# Patient Record
Sex: MALE | Race: WHITE | NOT HISPANIC OR LATINO | Employment: UNEMPLOYED | URBAN - METROPOLITAN AREA
[De-identification: names, ages, dates, MRNs, and addresses within clinical notes are randomized per-mention and may not be internally consistent; named-entity substitution may affect disease eponyms.]

---

## 2020-07-21 ENCOUNTER — TRANSFERRED RECORDS (OUTPATIENT)
Dept: HEALTH INFORMATION MANAGEMENT | Facility: CLINIC | Age: 55
End: 2020-07-21

## 2023-03-13 ENCOUNTER — TRANSFERRED RECORDS (OUTPATIENT)
Dept: HEALTH INFORMATION MANAGEMENT | Facility: CLINIC | Age: 58
End: 2023-03-13

## 2023-04-12 ENCOUNTER — TRANSFERRED RECORDS (OUTPATIENT)
Dept: HEALTH INFORMATION MANAGEMENT | Facility: CLINIC | Age: 58
End: 2023-04-12

## 2023-06-12 ENCOUNTER — TRANSFERRED RECORDS (OUTPATIENT)
Dept: HEALTH INFORMATION MANAGEMENT | Facility: CLINIC | Age: 58
End: 2023-06-12

## 2023-11-10 NOTE — H&P (VIEW-ONLY)
[facility] [place]      Preoperative Consultation   Rolo Arevalo   : 1965   Gender: male    Date of Encounter: 2023    Nursing Notes:   Bisi Irby LPN  2023  9:33 AM  Addendum  Chief Complaint   Patient presents with     Pre-Op Exam     23, back fusion, Dr. Ronnie Rico     Patient Name:  Rolo Arevalo  Date of Exam:  2023  Date of Surgery:  2023    Primary Provider:  Nevin Valladares NP  Provider performing PreOp: Nevin Valladares CNP  Planned Surgery: fusion, L5-S1  Surgeon:  Dr. Trujillo  Proposed anesthesia: General  Anesthesia Complications: None  History of abnormal bleeding : None  History of Blood Transfusions: No    Surgery Location:  [facility]  Surgery Location Phone:   Surgery Location Fax: [phone]    Last Tetanus: last tetanus booster within 10 years    Does the patient current use CPAP? No  Do you snore loudly (louder than talking or enough to be heard through closed doors? No  Do you often feel fatigued, tired, or sleepy during the daytime? No  Has anyone observed you stop breathing during your sleep? No  Do you have or are you being treated for high blood pressure? Yes  Is your BMI greater than 35? No  Are you older than 50? Yes  Is your neck circumference greater than 40cm? No  Are you a male? Yes      N/A   High Risk (yes to 5 or more)      X    Intermediate risk (Yes to 3-4)    N/A   Low Risk (yes to 2 or less)       Cydney LEGER LPN......9:00 AM 2023        History of Present Illness   Rolo is a 58 yr male who presents to clinic today for preoperative examination. He will be having L5-S1 with Dr. Trujillo on 23.   PMH hypertension, on lisinopril/HCTZ. Denies chest pain, dyspnea, leg edema. Normal stress test 2023. Holter monitor results normal.  Recently started on lexapro and hydroxyzine for anxiety and depression, feels his symptoms are improving.   Denies any recent cough, cold, congestion or other URI symptoms.    Able to tolerate 4 METS activity without cardiopulmonary symptoms, but is limited by mobility, low back pain.    Review of Systems   Review of Systems   Constitutional:  Negative for chills, diaphoresis, fever and malaise/fatigue.   HENT: Negative.     Respiratory:  Negative for cough, shortness of breath and wheezing.    Cardiovascular:  Negative for chest pain, palpitations, orthopnea and leg swelling.   Genitourinary: Negative.    Musculoskeletal:  Positive for back pain.   Skin: Negative.    Neurological: Negative.       Patient Active Problem List   Diagnosis Code     Alcohol abuse F10.10     Cervical radiculopathy M54.12     Lumbar radiculopathy M54.16     Lumbar spinal stenosis M48.061     DDD (degenerative disc disease), cervical M50.30     Essential hypertension I10     Right inguinal hernia K40.90     Chronic bilateral low back pain with left-sided sciatica M54.42, G89.29     Left sided sciatica M54.32     Spondylosis without myelopathy or radiculopathy, lumbar region M47.816     Lumbar back pain M54.50     Myofascial pain syndrome of lumbar spine M79.18     Acute lumbar myofascial strain S39.012A     Spondylosis of lumbar region without myelopathy or radiculopathy M47.816     Lumbar pain M54.50     Back pain M54.9     Lumbar stenosis with neurogenic claudication M48.062     Current Outpatient Medications   Medication Sig     escitalopram oxalate (LEXAPRO) 10 mg tablet Take 1 Tablet (10 mg) by mouth once daily.     hydroCHLOROthiazide (HCTZ) 25 mg tablet Take 0.5 Tablets (12.5 mg) by mouth once daily.     hydrOXYzine HCL (ATARAX) 25 mg tablet Take 1 Tablet (25 mg) by mouth every 8 hours if needed for Anxiety.     lisinopriL (PRINIVIL; ZESTRIL) 40 mg tablet Take 1 Tablet (40 mg) by mouth once daily.     No current facility-administered medications for this visit.     Medications have been reviewed by me and are current to the best of my knowledge and ability.     No Known Allergies  Past Surgical History:  "  . Laterality Date     APPENDECTOMY       COLONOSCOPY  10/20/2023    Adenomatous polyp, hemorrhoids     EPIDURAL STEROID INJECTION Left 03/29/2023    Transforaminal L4, L5     EPIDURAL STEROID INJECTION Left 09/06/2023    Transforaminal L5, S1 (x2) 09/06/2023, 05/03/2023     Social History     Tobacco Use     Smoking status: Never     Smokeless tobacco: Never   Vaping Use     Vaping Use: Never used   Substance Use Topics     Alcohol use: Yes     Alcohol/week: 48.0 standard drinks of alcohol     Types: 48 Cans of beer per week     Comment: at least 2 cases per week     Drug use: Never     Family History   Problem Relation Age of Onset     Cancer Mother      Bilateral breast cancer Mother      Heart Disease Father      No Known Problems Sister      No Known Problems Daughter      No Known Problems Son        PAST DIFFICULTY WITH ANESTHESIA: None     Physical Exam   /90 (Cuff Site: Right Arm, Position: Sitting, Cuff Size: Adult Regular)   Pulse 71   Temp 97.4  F (36.3  C) (Oral)   Resp 16   Ht 1.708 m (5' 7.24\")   Wt 75.3 kg (166 lb)   SpO2 100%   BMI 25.81 kg/m   Body mass index is 25.81 kg/m .  Physical Exam  Vitals and nursing note reviewed.   Constitutional:       Appearance: Normal appearance.   HENT:      Nose: Nose normal.      Mouth/Throat:      Pharynx: Oropharynx is clear.   Cardiovascular:      Rate and Rhythm: Normal rate and regular rhythm.      Heart sounds: Normal heart sounds.   Pulmonary:      Effort: Pulmonary effort is normal. No respiratory distress.      Breath sounds: Normal breath sounds.   Skin:     General: Skin is warm and dry.      Capillary Refill: Capillary refill takes less than 2 seconds.   Neurological:      General: No focal deficit present.      Mental Status: He is alert.   Psychiatric:         Mood and Affect: Mood normal.        Assessment / Plan       The Pre-Op Tool    Recommendations      Intermediate Risk Procedure    Risk of CV Complication (RCRI)  0.5%    Current " Cardiac Status  Poor exertional capacity ( < 4 mets )    Cardiac History  No history of coronary artery disease           Labs  HGB within last 30 days  Potassium within last 30 days  EKG  Not indicated  CXR  Not indicated    Stress Testing  Not indicated    * Testing recommendations are intended to assist, but not direct, clinical decisions.           Type & Screen should be obtained by Anesthesia only if the risk of transfusion is > 5% for the procedure         Hold Lisinopril / HCTZ the evening before and/or morning of the procedure.  Hold Ibuprofen (Advil) for 2 days prior to the procedure.  Take your other medications as usual prior to the procedure  Okay to take Acetaminophen (Tylenol) up until the procedure  Hold / avoid NSAIDs (e.g. ibuprofen, naproxen) prior to procedure: 2 days for ibuprofen (Advil) and 4 days for naproxen (Aleve).  Follow instructions given by your surgical team    * Medication recommendations are not intended to be exhaustive; they are limited to common medications that are potentially dangerous if incorrectly managed          Labs  * Data supports elimination of  routine  laboratory testing in favor of focused,  indicated  testing based on medical co-morbidities. A 2009 study randomized 1061 patients undergoing ambulatory, non-cataract surgery to routine or to indicated testing. Perioperative adverse events were similar (Anesthesia & Analgesia 2009;108:467-75; Anesthesiol. Clin. 2016 Mar;34(1):43-58).  EKG  * Age alone is not an absolute indication for a preoperative EKG, and in the absence of clinical risk factors, there is no consensus on the utility of routine preoperative EKG. Our experts recommend against obtaining an EKG if age < 65 for intermediate risk procedures (Anesthesology. 2012;116(3) 1-17; JACC. 2014;64(21);e1-76).  Stress Testing  * The current ACC/AHA guideline states that 'non-invasive stress testing is not useful for patients [with no clinical risk factors] undergoing  noncardiac surgery' ([facility]. 2014;64(21);e1-76.).     Session ID: [id number]  Endnotes and bibliography available upon request: [email or web address]  Labs:   11/9/2023  WBC: 4.5     11/9/2023  RBC: 4.71  11/9/2023  HGB: 14.5  11/9/2023  HCT: 44.2   11/9/2023  MCV: 94    11/9/2023  MCH: 30.8   11/9/2023 MCHC: 32.8   11/9/2023  RDW: 13.2   11/9/2023  PLT: 399  SODIUM   Date Value Ref Range Status   11/09/2023 135 (L) 137 - 145 mmol/L Final     POTASSIUM   Date Value Ref Range Status   11/09/2023 5.0 3.5 - 5.1 mmol/L Final     CHLORIDE   Date Value Ref Range Status   11/09/2023 97 (L) 98 - 107 mmol/L Final     CO2,TOTAL   Date Value Ref Range Status   11/09/2023 31 (H) 22 - 30 mmol/L Final     ANION GAP WWH   Date Value Ref Range Status   11/09/2023 7 5 - 18 Final     GLUCOSE   Date Value Ref Range Status   11/09/2023 109 (H) 65 - 100 mg/dL Final     BUN   Date Value Ref Range Status   11/09/2023 18.0 9.0 - 20.0 mg/dL Final     CREATININE   Date Value Ref Range Status   11/09/2023 0.79 0.66 - 1.25 mg/dL Final     CALCIUM   Date Value Ref Range Status   11/09/2023 9.6 8.4 - 10.2 mg/dL Final     ECG: 3/13/23: sinus rhythm, occasional PVC    ICD-10-CM    1. Preop examination  Z01.818 BASIC METABOLIC PANEL     CBC AND DIFFERENTIAL      2. Chronic bilateral low back pain with left-sided sciatica  M54.42     G89.29         Patient is scheduled L5-S1 fusion, for this procedure pt is intermediate risk for anesthesia complications with a stop bang score of 3.   Medical conditions are diagnostically and therapeutically optimized for planned procedure.      This document was partially prepared using Dragon software.     Total time preparing to see this patient, face-to-face time, and coordinating care time on the same calendar date: 35 minutes.    Nevin Valladares CNP . . . 12:08 PM . . . 11/10/2023

## 2023-12-04 RX ORDER — HYDROCHLOROTHIAZIDE 25 MG/1
0.5 TABLET ORAL DAILY
COMMUNITY
Start: 2023-02-15

## 2023-12-04 RX ORDER — LISINOPRIL 40 MG/1
1 TABLET ORAL DAILY
COMMUNITY
Start: 2023-02-15

## 2023-12-04 RX ORDER — ESCITALOPRAM OXALATE 10 MG/1
1 TABLET ORAL DAILY
COMMUNITY
Start: 2023-09-18

## 2023-12-04 RX ORDER — HYDROXYZINE HYDROCHLORIDE 25 MG/1
1 TABLET, FILM COATED ORAL EVERY 8 HOURS PRN
COMMUNITY
Start: 2023-11-13

## 2023-12-04 RX ORDER — PREGABALIN 75 MG/1
75 CAPSULE ORAL 3 TIMES DAILY
COMMUNITY

## 2023-12-07 ENCOUNTER — ANESTHESIA (OUTPATIENT)
Dept: SURGERY | Facility: CLINIC | Age: 58
End: 2023-12-07
Payer: COMMERCIAL

## 2023-12-07 ENCOUNTER — APPOINTMENT (OUTPATIENT)
Dept: RADIOLOGY | Facility: CLINIC | Age: 58
End: 2023-12-07
Attending: ORTHOPAEDIC SURGERY
Payer: COMMERCIAL

## 2023-12-07 ENCOUNTER — ANESTHESIA EVENT (OUTPATIENT)
Dept: SURGERY | Facility: CLINIC | Age: 58
End: 2023-12-07
Payer: COMMERCIAL

## 2023-12-07 ENCOUNTER — HOSPITAL ENCOUNTER (INPATIENT)
Facility: CLINIC | Age: 58
LOS: 2 days | Discharge: HOME | End: 2023-12-09
Attending: ORTHOPAEDIC SURGERY | Admitting: ORTHOPAEDIC SURGERY
Payer: COMMERCIAL

## 2023-12-07 DIAGNOSIS — M54.50 LUMBAR BACK PAIN: Primary | ICD-10-CM

## 2023-12-07 LAB — GLUCOSE BLDC GLUCOMTR-MCNC: 109 MG/DL (ref 70–99)

## 2023-12-07 PROCEDURE — 0ST20ZZ RESECTION OF LUMBAR VERTEBRAL DISC, OPEN APPROACH: ICD-10-PCS | Performed by: ORTHOPAEDIC SURGERY

## 2023-12-07 PROCEDURE — 250N000011 HC RX IP 250 OP 636

## 2023-12-07 PROCEDURE — 710N000010 HC RECOVERY PHASE 1, LEVEL 2, PER MIN: Performed by: ORTHOPAEDIC SURGERY

## 2023-12-07 PROCEDURE — 250N000011 HC RX IP 250 OP 636: Performed by: PHYSICIAN ASSISTANT

## 2023-12-07 PROCEDURE — L8699 PROSTHETIC IMPLANT NOS: HCPCS | Performed by: ORTHOPAEDIC SURGERY

## 2023-12-07 PROCEDURE — 272N000004 HC RX 272: Performed by: ORTHOPAEDIC SURGERY

## 2023-12-07 PROCEDURE — 99221 1ST HOSP IP/OBS SF/LOW 40: CPT | Performed by: HOSPITALIST

## 2023-12-07 PROCEDURE — C1713 ANCHOR/SCREW BN/BN,TIS/BN: HCPCS | Performed by: ORTHOPAEDIC SURGERY

## 2023-12-07 PROCEDURE — 999N000182 XR SURGERY CARM FLUORO GREATER THAN 5 MIN: Mod: TC

## 2023-12-07 PROCEDURE — 999N000063 XR ABDOMEN PORT 1 VIEW

## 2023-12-07 PROCEDURE — 250N000009 HC RX 250: Performed by: PHYSICIAN ASSISTANT

## 2023-12-07 PROCEDURE — 258N000003 HC RX IP 258 OP 636

## 2023-12-07 PROCEDURE — C1762 CONN TISS, HUMAN(INC FASCIA): HCPCS | Performed by: ORTHOPAEDIC SURGERY

## 2023-12-07 PROCEDURE — 278N000051 HC OR IMPLANT GENERAL: Performed by: ORTHOPAEDIC SURGERY

## 2023-12-07 PROCEDURE — 250N000011 HC RX IP 250 OP 636: Mod: JZ | Performed by: ANESTHESIOLOGY

## 2023-12-07 PROCEDURE — 999N000141 HC STATISTIC PRE-PROCEDURE NURSING ASSESSMENT: Performed by: ORTHOPAEDIC SURGERY

## 2023-12-07 PROCEDURE — 258N000003 HC RX IP 258 OP 636: Performed by: ANESTHESIOLOGY

## 2023-12-07 PROCEDURE — 250N000009 HC RX 250

## 2023-12-07 PROCEDURE — 258N000003 HC RX IP 258 OP 636: Performed by: PHYSICIAN ASSISTANT

## 2023-12-07 PROCEDURE — 120N000001 HC R&B MED SURG/OB

## 2023-12-07 PROCEDURE — 370N000017 HC ANESTHESIA TECHNICAL FEE, PER MIN: Performed by: ORTHOPAEDIC SURGERY

## 2023-12-07 PROCEDURE — 250N000013 HC RX MED GY IP 250 OP 250 PS 637: Performed by: ANESTHESIOLOGY

## 2023-12-07 PROCEDURE — 250N000005 HC OR RX SURGIFLO HEMOSTATIC MATRIX 10ML 199102S OPNP: Performed by: ORTHOPAEDIC SURGERY

## 2023-12-07 PROCEDURE — 360N000078 HC SURGERY LEVEL 5, PER MIN: Performed by: ORTHOPAEDIC SURGERY

## 2023-12-07 PROCEDURE — 0SG30A0 FUSION OF LUMBOSACRAL JOINT WITH INTERBODY FUSION DEVICE, ANTERIOR APPROACH, ANTERIOR COLUMN, OPEN APPROACH: ICD-10-PCS | Performed by: ORTHOPAEDIC SURGERY

## 2023-12-07 PROCEDURE — 258N000003 HC RX IP 258 OP 636: Performed by: ORTHOPAEDIC SURGERY

## 2023-12-07 PROCEDURE — 250N000011 HC RX IP 250 OP 636: Performed by: ORTHOPAEDIC SURGERY

## 2023-12-07 PROCEDURE — 250N000013 HC RX MED GY IP 250 OP 250 PS 637: Performed by: PHYSICIAN ASSISTANT

## 2023-12-07 PROCEDURE — 272N000001 HC OR GENERAL SUPPLY STERILE: Performed by: ORTHOPAEDIC SURGERY

## 2023-12-07 PROCEDURE — 250N000009 HC RX 250: Performed by: ORTHOPAEDIC SURGERY

## 2023-12-07 DEVICE — BONE SCREW 4675030 LS 5.0X30MM
Type: IMPLANTABLE DEVICE | Site: ABDOMEN | Status: FUNCTIONAL
Brand: ANTERALIGN SPINAL SYSTEM WITH TITAN NANOLOCK SURFACE TECHNOLOGY

## 2023-12-07 DEVICE — GRAFT BN CANC 30CC CRSH 1-10MM 800104: Type: IMPLANTABLE DEVICE | Site: ABDOMEN | Status: FUNCTIONAL

## 2023-12-07 DEVICE — GRAFT BONE INFUSE BMP SM 7510200: Type: IMPLANTABLE DEVICE | Site: ABDOMEN | Status: FUNCTIONAL

## 2023-12-07 DEVICE — IMPLANTABLE DEVICE: Type: IMPLANTABLE DEVICE | Site: ABDOMEN | Status: FUNCTIONAL

## 2023-12-07 RX ORDER — HYDROMORPHONE HCL IN WATER/PF 6 MG/30 ML
0.2 PATIENT CONTROLLED ANALGESIA SYRINGE INTRAVENOUS
Status: DISCONTINUED | OUTPATIENT
Start: 2023-12-07 | End: 2023-12-09 | Stop reason: HOSPADM

## 2023-12-07 RX ORDER — ACETAMINOPHEN 500 MG
1000 TABLET ORAL ONCE
Status: COMPLETED | OUTPATIENT
Start: 2023-12-07 | End: 2023-12-07

## 2023-12-07 RX ORDER — SODIUM CHLORIDE, SODIUM LACTATE, POTASSIUM CHLORIDE, CALCIUM CHLORIDE 600; 310; 30; 20 MG/100ML; MG/100ML; MG/100ML; MG/100ML
INJECTION, SOLUTION INTRAVENOUS CONTINUOUS
Status: DISCONTINUED | OUTPATIENT
Start: 2023-12-07 | End: 2023-12-07 | Stop reason: HOSPADM

## 2023-12-07 RX ORDER — SODIUM CHLORIDE 9 MG/ML
INJECTION, SOLUTION INTRAVENOUS CONTINUOUS
Status: DISCONTINUED | OUTPATIENT
Start: 2023-12-07 | End: 2023-12-09 | Stop reason: HOSPADM

## 2023-12-07 RX ORDER — ONDANSETRON 2 MG/ML
4 INJECTION INTRAMUSCULAR; INTRAVENOUS EVERY 30 MIN PRN
Status: DISCONTINUED | OUTPATIENT
Start: 2023-12-07 | End: 2023-12-07 | Stop reason: HOSPADM

## 2023-12-07 RX ORDER — CEFAZOLIN SODIUM/WATER 2 G/20 ML
2 SYRINGE (ML) INTRAVENOUS
Status: COMPLETED | OUTPATIENT
Start: 2023-12-07 | End: 2023-12-07

## 2023-12-07 RX ORDER — DEXAMETHASONE SODIUM PHOSPHATE 10 MG/ML
INJECTION, SOLUTION INTRAMUSCULAR; INTRAVENOUS PRN
Status: DISCONTINUED | OUTPATIENT
Start: 2023-12-07 | End: 2023-12-07

## 2023-12-07 RX ORDER — PREGABALIN 75 MG/1
75 CAPSULE ORAL 3 TIMES DAILY
Status: DISCONTINUED | OUTPATIENT
Start: 2023-12-07 | End: 2023-12-09 | Stop reason: HOSPADM

## 2023-12-07 RX ORDER — PROCHLORPERAZINE MALEATE 10 MG
10 TABLET ORAL EVERY 6 HOURS PRN
Status: DISCONTINUED | OUTPATIENT
Start: 2023-12-07 | End: 2023-12-09 | Stop reason: HOSPADM

## 2023-12-07 RX ORDER — OXYCODONE HYDROCHLORIDE 5 MG/1
10 TABLET ORAL EVERY 4 HOURS PRN
Status: DISCONTINUED | OUTPATIENT
Start: 2023-12-07 | End: 2023-12-09 | Stop reason: HOSPADM

## 2023-12-07 RX ORDER — NALOXONE HYDROCHLORIDE 0.4 MG/ML
0.4 INJECTION, SOLUTION INTRAMUSCULAR; INTRAVENOUS; SUBCUTANEOUS
Status: DISCONTINUED | OUTPATIENT
Start: 2023-12-07 | End: 2023-12-09 | Stop reason: HOSPADM

## 2023-12-07 RX ORDER — HYDROMORPHONE HCL IN WATER/PF 6 MG/30 ML
0.2 PATIENT CONTROLLED ANALGESIA SYRINGE INTRAVENOUS EVERY 5 MIN PRN
Status: DISCONTINUED | OUTPATIENT
Start: 2023-12-07 | End: 2023-12-07 | Stop reason: HOSPADM

## 2023-12-07 RX ORDER — ONDANSETRON 2 MG/ML
4 INJECTION INTRAMUSCULAR; INTRAVENOUS EVERY 30 MIN PRN
Status: CANCELLED | OUTPATIENT
Start: 2023-12-07

## 2023-12-07 RX ORDER — LISINOPRIL 40 MG/1
40 TABLET ORAL DAILY
Status: DISCONTINUED | OUTPATIENT
Start: 2023-12-08 | End: 2023-12-09 | Stop reason: HOSPADM

## 2023-12-07 RX ORDER — CEFAZOLIN SODIUM/WATER 2 G/20 ML
2 SYRINGE (ML) INTRAVENOUS SEE ADMIN INSTRUCTIONS
Status: DISCONTINUED | OUTPATIENT
Start: 2023-12-07 | End: 2023-12-07 | Stop reason: HOSPADM

## 2023-12-07 RX ORDER — POLYETHYLENE GLYCOL 3350 17 G/17G
17 POWDER, FOR SOLUTION ORAL DAILY
Status: DISCONTINUED | OUTPATIENT
Start: 2023-12-08 | End: 2023-12-09 | Stop reason: HOSPADM

## 2023-12-07 RX ORDER — MULTIVITAMIN,THERAPEUTIC
1 TABLET ORAL DAILY
Status: DISCONTINUED | OUTPATIENT
Start: 2023-12-08 | End: 2023-12-09 | Stop reason: HOSPADM

## 2023-12-07 RX ORDER — CEFAZOLIN SODIUM 1 G/3ML
1 INJECTION, POWDER, FOR SOLUTION INTRAMUSCULAR; INTRAVENOUS EVERY 8 HOURS
Qty: 10 ML | Refills: 0 | Status: COMPLETED | OUTPATIENT
Start: 2023-12-07 | End: 2023-12-08

## 2023-12-07 RX ORDER — OXYCODONE HYDROCHLORIDE 5 MG/1
10 TABLET ORAL
Status: CANCELLED | OUTPATIENT
Start: 2023-12-07

## 2023-12-07 RX ORDER — HYDROCHLOROTHIAZIDE 12.5 MG/1
12.5 CAPSULE ORAL DAILY
Status: DISCONTINUED | OUTPATIENT
Start: 2023-12-08 | End: 2023-12-09 | Stop reason: HOSPADM

## 2023-12-07 RX ORDER — HYDROXYZINE HYDROCHLORIDE 25 MG/1
25 TABLET, FILM COATED ORAL EVERY 6 HOURS PRN
Status: DISCONTINUED | OUTPATIENT
Start: 2023-12-07 | End: 2023-12-09 | Stop reason: HOSPADM

## 2023-12-07 RX ORDER — MAGNESIUM SULFATE 4 G/50ML
4 INJECTION INTRAVENOUS ONCE
Status: DISCONTINUED | OUTPATIENT
Start: 2023-12-07 | End: 2023-12-07 | Stop reason: HOSPADM

## 2023-12-07 RX ORDER — MAGNESIUM SULFATE 4 G/50ML
INJECTION INTRAVENOUS PRN
Status: DISCONTINUED | OUTPATIENT
Start: 2023-12-07 | End: 2023-12-07

## 2023-12-07 RX ORDER — HYDROMORPHONE HCL IN WATER/PF 6 MG/30 ML
0.4 PATIENT CONTROLLED ANALGESIA SYRINGE INTRAVENOUS
Status: DISCONTINUED | OUTPATIENT
Start: 2023-12-07 | End: 2023-12-09 | Stop reason: HOSPADM

## 2023-12-07 RX ORDER — ONDANSETRON 4 MG/1
4 TABLET, ORALLY DISINTEGRATING ORAL EVERY 30 MIN PRN
Status: DISCONTINUED | OUTPATIENT
Start: 2023-12-07 | End: 2023-12-07 | Stop reason: HOSPADM

## 2023-12-07 RX ORDER — ONDANSETRON 4 MG/1
4 TABLET, ORALLY DISINTEGRATING ORAL EVERY 6 HOURS PRN
Status: DISCONTINUED | OUTPATIENT
Start: 2023-12-07 | End: 2023-12-09 | Stop reason: HOSPADM

## 2023-12-07 RX ORDER — ONDANSETRON 2 MG/ML
4 INJECTION INTRAMUSCULAR; INTRAVENOUS EVERY 6 HOURS PRN
Status: DISCONTINUED | OUTPATIENT
Start: 2023-12-07 | End: 2023-12-09 | Stop reason: HOSPADM

## 2023-12-07 RX ORDER — AMOXICILLIN 250 MG
1 CAPSULE ORAL 2 TIMES DAILY
Status: DISCONTINUED | OUTPATIENT
Start: 2023-12-07 | End: 2023-12-09 | Stop reason: HOSPADM

## 2023-12-07 RX ORDER — ACETAMINOPHEN 325 MG/1
975 TABLET ORAL EVERY 8 HOURS
Status: DISCONTINUED | OUTPATIENT
Start: 2023-12-07 | End: 2023-12-09 | Stop reason: HOSPADM

## 2023-12-07 RX ORDER — PROPOFOL 10 MG/ML
INJECTION, EMULSION INTRAVENOUS PRN
Status: DISCONTINUED | OUTPATIENT
Start: 2023-12-07 | End: 2023-12-07

## 2023-12-07 RX ORDER — BISACODYL 10 MG
10 SUPPOSITORY, RECTAL RECTAL DAILY PRN
Status: DISCONTINUED | OUTPATIENT
Start: 2023-12-07 | End: 2023-12-09 | Stop reason: HOSPADM

## 2023-12-07 RX ORDER — FENTANYL CITRATE 50 UG/ML
25 INJECTION, SOLUTION INTRAMUSCULAR; INTRAVENOUS EVERY 5 MIN PRN
Status: DISCONTINUED | OUTPATIENT
Start: 2023-12-07 | End: 2023-12-07 | Stop reason: HOSPADM

## 2023-12-07 RX ORDER — LIDOCAINE HYDROCHLORIDE 10 MG/ML
INJECTION, SOLUTION INFILTRATION; PERINEURAL PRN
Status: DISCONTINUED | OUTPATIENT
Start: 2023-12-07 | End: 2023-12-07

## 2023-12-07 RX ORDER — HYDROMORPHONE HCL IN WATER/PF 6 MG/30 ML
0.4 PATIENT CONTROLLED ANALGESIA SYRINGE INTRAVENOUS EVERY 5 MIN PRN
Status: DISCONTINUED | OUTPATIENT
Start: 2023-12-07 | End: 2023-12-07 | Stop reason: HOSPADM

## 2023-12-07 RX ORDER — ESCITALOPRAM OXALATE 10 MG/1
10 TABLET ORAL DAILY
Status: DISCONTINUED | OUTPATIENT
Start: 2023-12-08 | End: 2023-12-09 | Stop reason: HOSPADM

## 2023-12-07 RX ORDER — TIZANIDINE 2 MG/1
4 TABLET ORAL EVERY 8 HOURS PRN
Status: DISCONTINUED | OUTPATIENT
Start: 2023-12-07 | End: 2023-12-09 | Stop reason: HOSPADM

## 2023-12-07 RX ORDER — LIDOCAINE 40 MG/G
CREAM TOPICAL
Status: DISCONTINUED | OUTPATIENT
Start: 2023-12-07 | End: 2023-12-07 | Stop reason: HOSPADM

## 2023-12-07 RX ORDER — PROPOFOL 10 MG/ML
INJECTION, EMULSION INTRAVENOUS CONTINUOUS PRN
Status: DISCONTINUED | OUTPATIENT
Start: 2023-12-07 | End: 2023-12-07

## 2023-12-07 RX ORDER — TRANEXAMIC ACID 10 MG/ML
5 INJECTION, SOLUTION INTRAVENOUS CONTINUOUS
Status: DISCONTINUED | OUTPATIENT
Start: 2023-12-07 | End: 2023-12-07 | Stop reason: HOSPADM

## 2023-12-07 RX ORDER — ONDANSETRON 2 MG/ML
INJECTION INTRAMUSCULAR; INTRAVENOUS PRN
Status: DISCONTINUED | OUTPATIENT
Start: 2023-12-07 | End: 2023-12-07

## 2023-12-07 RX ORDER — ACETAMINOPHEN 325 MG/1
650 TABLET ORAL EVERY 4 HOURS PRN
Status: DISCONTINUED | OUTPATIENT
Start: 2023-12-10 | End: 2023-12-09 | Stop reason: HOSPADM

## 2023-12-07 RX ORDER — LORATADINE 10 MG/1
10 TABLET ORAL DAILY PRN
Status: DISCONTINUED | OUTPATIENT
Start: 2023-12-07 | End: 2023-12-09 | Stop reason: HOSPADM

## 2023-12-07 RX ORDER — NALOXONE HYDROCHLORIDE 0.4 MG/ML
0.2 INJECTION, SOLUTION INTRAMUSCULAR; INTRAVENOUS; SUBCUTANEOUS
Status: DISCONTINUED | OUTPATIENT
Start: 2023-12-07 | End: 2023-12-09 | Stop reason: HOSPADM

## 2023-12-07 RX ORDER — OXYCODONE HYDROCHLORIDE 5 MG/1
5 TABLET ORAL
Status: CANCELLED | OUTPATIENT
Start: 2023-12-07

## 2023-12-07 RX ORDER — ONDANSETRON 4 MG/1
4 TABLET, ORALLY DISINTEGRATING ORAL EVERY 30 MIN PRN
Status: CANCELLED | OUTPATIENT
Start: 2023-12-07

## 2023-12-07 RX ORDER — FENTANYL CITRATE 50 UG/ML
50 INJECTION, SOLUTION INTRAMUSCULAR; INTRAVENOUS EVERY 5 MIN PRN
Status: DISCONTINUED | OUTPATIENT
Start: 2023-12-07 | End: 2023-12-07 | Stop reason: HOSPADM

## 2023-12-07 RX ORDER — OXYCODONE HYDROCHLORIDE 5 MG/1
5 TABLET ORAL EVERY 4 HOURS PRN
Status: DISCONTINUED | OUTPATIENT
Start: 2023-12-07 | End: 2023-12-09 | Stop reason: HOSPADM

## 2023-12-07 RX ORDER — FENTANYL CITRATE 50 UG/ML
25 INJECTION, SOLUTION INTRAMUSCULAR; INTRAVENOUS
Status: CANCELLED | OUTPATIENT
Start: 2023-12-07

## 2023-12-07 RX ORDER — GABAPENTIN 300 MG/1
300 CAPSULE ORAL
Status: COMPLETED | OUTPATIENT
Start: 2023-12-07 | End: 2023-12-07

## 2023-12-07 RX ADMIN — PROPOFOL 180 MG: 10 INJECTION, EMULSION INTRAVENOUS at 12:09

## 2023-12-07 RX ADMIN — HYDROMORPHONE HYDROCHLORIDE 1 MG: 1 INJECTION, SOLUTION INTRAMUSCULAR; INTRAVENOUS; SUBCUTANEOUS at 12:17

## 2023-12-07 RX ADMIN — FENTANYL CITRATE 50 MCG: 50 INJECTION, SOLUTION INTRAMUSCULAR; INTRAVENOUS at 14:14

## 2023-12-07 RX ADMIN — ROCURONIUM BROMIDE 60 MG: 10 INJECTION, SOLUTION INTRAVENOUS at 12:09

## 2023-12-07 RX ADMIN — HYDROMORPHONE HYDROCHLORIDE 0.4 MG: 0.2 INJECTION, SOLUTION INTRAMUSCULAR; INTRAVENOUS; SUBCUTANEOUS at 14:46

## 2023-12-07 RX ADMIN — ACETAMINOPHEN 975 MG: 325 TABLET ORAL at 18:56

## 2023-12-07 RX ADMIN — ROCURONIUM BROMIDE 20 MG: 10 INJECTION, SOLUTION INTRAVENOUS at 12:46

## 2023-12-07 RX ADMIN — PROPOFOL 200 MCG/KG/MIN: 10 INJECTION, EMULSION INTRAVENOUS at 12:09

## 2023-12-07 RX ADMIN — SODIUM CHLORIDE: 9 INJECTION, SOLUTION INTRAVENOUS at 18:57

## 2023-12-07 RX ADMIN — MIDAZOLAM 2 MG: 1 INJECTION INTRAMUSCULAR; INTRAVENOUS at 12:06

## 2023-12-07 RX ADMIN — ONDANSETRON 4 MG: 2 INJECTION INTRAMUSCULAR; INTRAVENOUS at 13:15

## 2023-12-07 RX ADMIN — DEXMEDETOMIDINE HYDROCHLORIDE 8 MCG: 100 INJECTION, SOLUTION INTRAVENOUS at 12:49

## 2023-12-07 RX ADMIN — OXYCODONE HYDROCHLORIDE 5 MG: 5 TABLET ORAL at 16:14

## 2023-12-07 RX ADMIN — PREGABALIN 75 MG: 75 CAPSULE ORAL at 20:52

## 2023-12-07 RX ADMIN — ACETAMINOPHEN 1000 MG: 500 TABLET ORAL at 12:01

## 2023-12-07 RX ADMIN — GABAPENTIN 300 MG: 300 CAPSULE ORAL at 10:55

## 2023-12-07 RX ADMIN — FENTANYL CITRATE 50 MCG: 50 INJECTION, SOLUTION INTRAMUSCULAR; INTRAVENOUS at 14:02

## 2023-12-07 RX ADMIN — FENTANYL CITRATE 50 MCG: 50 INJECTION, SOLUTION INTRAMUSCULAR; INTRAVENOUS at 13:57

## 2023-12-07 RX ADMIN — HYDROXYZINE HYDROCHLORIDE 25 MG: 25 TABLET ORAL at 16:14

## 2023-12-07 RX ADMIN — SODIUM CHLORIDE, POTASSIUM CHLORIDE, SODIUM LACTATE AND CALCIUM CHLORIDE: 600; 310; 30; 20 INJECTION, SOLUTION INTRAVENOUS at 12:37

## 2023-12-07 RX ADMIN — HYDROMORPHONE HYDROCHLORIDE 0.2 MG: 0.2 INJECTION, SOLUTION INTRAMUSCULAR; INTRAVENOUS; SUBCUTANEOUS at 14:39

## 2023-12-07 RX ADMIN — MAGNESIUM SULFATE HEPTAHYDRATE 4 G: 80 INJECTION, SOLUTION INTRAVENOUS at 12:04

## 2023-12-07 RX ADMIN — HYDROMORPHONE HYDROCHLORIDE 0.2 MG: 0.2 INJECTION, SOLUTION INTRAMUSCULAR; INTRAVENOUS; SUBCUTANEOUS at 14:31

## 2023-12-07 RX ADMIN — DEXMEDETOMIDINE HYDROCHLORIDE 4 MCG: 100 INJECTION, SOLUTION INTRAVENOUS at 12:57

## 2023-12-07 RX ADMIN — FENTANYL CITRATE 50 MCG: 50 INJECTION, SOLUTION INTRAMUSCULAR; INTRAVENOUS at 13:52

## 2023-12-07 RX ADMIN — CEFAZOLIN 1 G: 1 INJECTION, POWDER, FOR SOLUTION INTRAMUSCULAR; INTRAVENOUS at 20:51

## 2023-12-07 RX ADMIN — OXYCODONE HYDROCHLORIDE 5 MG: 5 TABLET ORAL at 22:26

## 2023-12-07 RX ADMIN — DEXAMETHASONE SODIUM PHOSPHATE 10 MG: 10 INJECTION, SOLUTION INTRAMUSCULAR; INTRAVENOUS at 12:09

## 2023-12-07 RX ADMIN — LIDOCAINE HYDROCHLORIDE 3 ML: 10 INJECTION, SOLUTION INFILTRATION; PERINEURAL at 13:35

## 2023-12-07 RX ADMIN — TRANEXAMIC ACID 1 G: 10 INJECTION, SOLUTION INTRAVENOUS at 12:04

## 2023-12-07 RX ADMIN — SUGAMMADEX 200 MG: 100 INJECTION, SOLUTION INTRAVENOUS at 13:28

## 2023-12-07 RX ADMIN — HYDROMORPHONE HYDROCHLORIDE 1 MG: 1 INJECTION, SOLUTION INTRAMUSCULAR; INTRAVENOUS; SUBCUTANEOUS at 12:09

## 2023-12-07 RX ADMIN — SENNOSIDES AND DOCUSATE SODIUM 1 TABLET: 8.6; 5 TABLET ORAL at 20:51

## 2023-12-07 RX ADMIN — Medication 2 G: at 12:01

## 2023-12-07 RX ADMIN — LIDOCAINE HYDROCHLORIDE 5 ML: 10 INJECTION, SOLUTION INFILTRATION; PERINEURAL at 12:09

## 2023-12-07 RX ADMIN — HYDROMORPHONE HYDROCHLORIDE 0.4 MG: 0.2 INJECTION, SOLUTION INTRAMUSCULAR; INTRAVENOUS; SUBCUTANEOUS at 14:23

## 2023-12-07 RX ADMIN — SODIUM CHLORIDE, POTASSIUM CHLORIDE, SODIUM LACTATE AND CALCIUM CHLORIDE: 600; 310; 30; 20 INJECTION, SOLUTION INTRAVENOUS at 11:12

## 2023-12-07 ASSESSMENT — ACTIVITIES OF DAILY LIVING (ADL)
ADLS_ACUITY_SCORE: 22
ADLS_ACUITY_SCORE: 20
ADLS_ACUITY_SCORE: 22
ADLS_ACUITY_SCORE: 22
ADLS_ACUITY_SCORE: 20

## 2023-12-07 NOTE — PHARMACY-ADMISSION MEDICATION HISTORY
Pharmacist Admission Medication History    Admission medication history is complete. The information provided in this note is only as accurate as the sources available at the time of the update.    Information Source(s): Patient via in-person    Pertinent Information: n/a    Changes made to PTA medication list:  Added: tizanidine  Deleted: None  Changed: None    Medication Affordability:  Not including over the counter (OTC) medications, was there a time in the past 3 months when you did not take your medications as prescribed because of cost?: No    Allergies reviewed with patient and updates made in EHR: yes    Medication History Completed By: Dionne Egan [facility] 12/7/2023 10:54 AM    PTA Med List   Medication Sig Note Last Dose    escitalopram (LEXAPRO) 10 MG tablet Take 1 tablet by mouth daily  12/6/2023    hydrochlorothiazide (HYDRODIURIL) 25 MG tablet Take 0.5 tablets by mouth daily  12/6/2023    hydrOXYzine (ATARAX) 25 MG tablet Take 1 tablet by mouth every 8 hours as needed for other or anxiety  rare    lisinopril (ZESTRIL) 40 MG tablet Take 1 tablet by mouth daily  12/6/2023    pregabalin (LYRICA) 75 MG capsule Take 75 mg by mouth 3 times daily 12/7/2023: Has been taking BID 12/6/2023 at pm    tiZANidine (ZANAFLEX) 4 MG tablet Take 2-4 mg by mouth 3 times daily as needed for muscle spasms (Has been taking BID mostly)  12/6/2023

## 2023-12-07 NOTE — OP NOTE
DATE OF SERVICE: 12/07/2023     PREOPERATIVE DIAGNOSES:  1.  L5-S1 degenerative disc disease with up/down foraminal stenosis and significant right and left-sided L5 neural compression.  2.  Failure of conservative measures.     POSTOPERATIVE DIAGNOSES:  Same     PROCEDURE:  1.  Anterior retroperitoneal exposure, performed by Dr. Cristobal Retana.  2.  Complete block diskectomy L5-S1.  3.  Decortication of the endplates L5-S1 and anterior spinal fusion with Medtronic Anteralign, large foot print, 14 mm height, 12 degree lordosis.  4.  Anterior screw placement into L5 and S1.  5. O arm navigation     SURGEON:  Dr. Jd Eng.     CO-SURGEON:  Dr. Cristobal Retana     ASSISTANT:  Tran Gilliland PA-C, who was needed for patient positioning, soft tissue retraction, patient safety and assistance with closure of the wound. She was also vital in the protection of neural tissues as well as vascular structures.  This could only have been performed by a surgical PA trained in spine surgery     ESTIMATED BLOOD LOSS: 35 mL.       DRAINS:  None.     COMPLICATIONS:  None perceived.     SPECIMENS SENT:  None.     HISTORY OF PRESENT ILLNESS/INDICATION FOR PROCEDURE:  This is a very pleasant male who came to my clinic with bilateral but right greater than left-sided leg pain.  He also had back pain, although not severe.  We discussed the options and he opted initially for nonoperative measures, including therapy, injections and giving it more time. Eventually, she got to the point where the leg pain was really bothersome.  He was also having back pain.  We had a very long and lauren discussion in clinic that the surgery would be done for the leg pain and not for low back pain.  He may get some relief from the back pain, but that is unusual.  The patient and family understood the situation well.  They had no further questions.  They also understood the risks of surgery including but not limited to bleeding, infection, nerve damage,  failure of the procedure to relieve symptoms, iatrogenic instability, dural laceration requiring repair, flat bed rest, DVT, PE, blindness and even death.  They also understood the risks of the anterior exposure as explained to them by Dr. Retana.     Therefore, the patient was seen in the preop area of [facility] today.  The belly was marked and the patient was brought to the operating room.  He was placed on a flat top table with care taken to pad all bony prominences.  He was prepped and draped in a standard fashion for an anterior lumbar exposure.  Dr. Retana performed the anterior retroperitoneal exposure which was dictated in a separate note.  We then verified our level at L5-S1 and performed a box annulotomy.  We then used a combination of curettes and pituitaries to remove the disk material and gently score the endplates.  We eventually gained height through a combination of dilating bullet devices.    We then placed a trial and verified its position on x-ray.  It looked to have good fit and fill.  We then placed a Medtronic implant as previously described, along with a small BMP and cancellous chips.  We then placed two descending screws into the S1 body through the cage and 1 ascending screw into L5.  PA and lateral x-ray were obtained and demonstrated good positioning.  We did place allograft bone lateral to the cage also.  When we were finished Dr. Retana irrigated the wound and closed it.  There was very good hemostasis.     The wounds were then irrigated and closed with 0 vicryl followed by 3-0 vicryl and finally with surgical glue and steri strips.  These were then covered with sterile bandages.      The patient will be weightbearing as tolerated on bilateral lower extremities with strict instructions to avoid bending, lifting and twisting over the next 6 weeks. He will have early mobilization and BETI stockings for DVT prophylaxis and 2 g of Ancef IV q. 8 hours x2 doses for antibiotics.   Return to see us in 6 weeks, sooner if there are any problems, questions or concerns

## 2023-12-07 NOTE — ANESTHESIA CARE TRANSFER NOTE
Patient: Rolo Arevalo    Procedure: Procedure(s):  LUMBAR 5-SACRAL 1 ANTERIOR LUMBAR INTERBODY FUSION  SURGICAL EXPOSURE, ANTERIOR APPROACH, WITH WOUND CLOSURE, FOR LUMBAR SPINE SURGERY, BY GENERAL SURGERY       Diagnosis: Foraminal stenosis of lumbar region [M48.061]  Diagnosis Additional Information: No value filed.    Anesthesia Type:   General     Note:    Oropharynx: oropharynx clear of all foreign objects  Level of Consciousness: awake  Oxygen Supplementation: face mask  Level of Supplemental Oxygen (L/min / FiO2): 10  Independent Airway: airway patency satisfactory and stable  Dentition: dentition unchanged  Vital Signs Stable: post-procedure vital signs reviewed and stable  Report to RN Given: handoff report given  Patient transferred to: PACU    Handoff Report: Identifed the Patient, Identified the Reponsible Provider, Reviewed the pertinent medical history, Discussed the surgical course, Reviewed Intra-OP anesthesia mangement and issues during anesthesia, Set expectations for post-procedure period and Allowed opportunity for questions and acknowledgement of understanding      Vitals:  Vitals Value Taken Time   /108 12/07/23 1346   Temp     Pulse 67 12/07/23 1346   Resp 17 12/07/23 1346   SpO2         Electronically Signed By: DEVYN Looney CRNA  December 7, 2023  1:48 PM

## 2023-12-07 NOTE — CONSULTS
"[facility]  Consult Note - Hospitalist Service  Date of Admission:  12/7/2023  Consult Requested by: orthopaedics  Reason for Consult: post operative medical management    Assessment & Plan   Rolo Arevalo is a 58 year old male admitted s/p decortication and L5/S1 anterior spinal fusion.  He is clinically stable.  Recommendations as below.    # s/p decortication and L5/S1  - per orthopaedics    # HTN  - lisinopril  - hydrochlorothiazide           Clinically Significant Risk Factors Present on Admission                  # Hypertension: Home medication list includes antihypertensive(s)      # Overweight: Estimated body mass index is 25.18 kg/m  as calculated from the following:    Height as of this encounter: 1.727 m (5' 8\").    Weight as of this encounter: 75.1 kg (165 lb 9.6 oz).              Kvng Garrett MD  Hospitalist Service  Securely message with [employer] (more info)  Text page via [employer] Paging/Directory   ______________________________________________________________________    Chief Complaint   s/p decortication and L5/S1    History is obtained from the patient    History of Present Illness   Rolo Arevalo is a 58 year old male with HTN who is admitted s/p .    He reports a 15 year history of back problems he thinks started with a work injury 25 years ago.  Denies chest pain/pressure or dyspnea.  Tolerating oral intake.  Denies numbness or tingling in the arms or legs.  Reports abdominal bloating.  He is not passing gas.      Past Medical History    Past Medical History:   Diagnosis Date    Hypertension        Past Surgical History   Past Surgical History:   Procedure Laterality Date    APPENDECTOMY         Medications   I have reviewed this patient's current medications          Physical Exam   Vital Signs: Temp: 98.1  F (36.7  C) Temp src: Temporal BP: (!) 138/104 Pulse: 88   Resp: 14 SpO2: 94 % O2 Device: Nasal cannula Oxygen Delivery: 2 LPM  Weight: 165 lbs 9.6 oz    Gen:  " lying in bed in no acute distress  Neuro: alert, conversant; moves toes in both feet, sensation grossly intact in both feet  CV:  nl rate, regular rhythm  Pulm:  no acute resp distress, ctab anteriorly  GI: mild tendnerness to palpation lateral to the lower right portion of his abdominal dressing    Medical Decision Making             Data   Reviewed:    XR abdomen  No unintended radiopaque foreign body. Interbody fusion with instrumentation lumbosacral interspace. Small amount of gas in the left pelvic retroperitoneal fat related to today's surgical exposure as expected. Abdomen otherwise normal.

## 2023-12-07 NOTE — INTERVAL H&P NOTE
"I have reviewed the surgical (or preoperative) H&P that is linked to this encounter, and examined the patient. There are no significant changes    Clinical Conditions Present on Arrival:  Clinically Significant Risk Factors Present on Admission                  # Overweight: Estimated body mass index is 28.28 kg/m  as calculated from the following:    Height as of this encounter: 1.727 m (5' 8\").    Weight as of this encounter: 84.4 kg (186 lb).       "

## 2023-12-07 NOTE — ANESTHESIA POSTPROCEDURE EVALUATION
Patient: Rolo Arevalo    Procedure: Procedure(s):  LUMBAR 5-SACRAL 1 ANTERIOR LUMBAR INTERBODY FUSION  SURGICAL EXPOSURE, ANTERIOR APPROACH, WITH WOUND CLOSURE, FOR LUMBAR SPINE SURGERY, BY GENERAL SURGERY       Anesthesia Type:  General    Note:     Postop Pain Control: Uneventful            Sign Out: Well controlled pain   PONV: No   Neuro/Psych: Uneventful            Sign Out: Acceptable/Baseline neuro status   Airway/Respiratory: Uneventful            Sign Out: Acceptable/Baseline resp. status   CV/Hemodynamics: Uneventful            Sign Out: Acceptable CV status; No obvious hypovolemia; No obvious fluid overload   Other NRE:    DID A NON-ROUTINE EVENT OCCUR? No           Last vitals:  Vitals Value Taken Time   /104 12/07/23 1515   Temp 36.7  C (98.1  F) 12/07/23 1450   Pulse 87 12/07/23 1529   Resp 14 12/07/23 1507   SpO2 94 % 12/07/23 1529   Vitals shown include unfiled device data.    Electronically Signed By: Javid Mccormick MD  December 7, 2023  3:31 PM

## 2023-12-07 NOTE — ANESTHESIA PREPROCEDURE EVALUATION
"Anesthesia Pre-Procedure Evaluation    Patient: Rolo Arevalo   MRN: [id number] : 1965        Procedure : Procedure(s):  LUMBAR 5-SACRAL 1 ANTERIOR LUMBAR INTERBODY FUSION  SURGICAL EXPOSURE, ANTERIOR APPROACH, WITH WOUND CLOSURE, FOR LUMBAR SPINE SURGERY, BY GENERAL SURGERY          Past Medical History:   Diagnosis Date    Hypertension       Past Surgical History:   Procedure Laterality Date    APPENDECTOMY        No Known Allergies   Social History     Tobacco Use    Smoking status: Never    Smokeless tobacco: Never   Substance Use Topics    Alcohol use: Yes     Comment: \"socially\"      Wt Readings from Last 1 Encounters:   23 75.1 kg (165 lb 9.6 oz)        Anesthesia Evaluation   Pt has had prior anesthetic.     No history of anesthetic complications       ROS/MED HX  ENT/Pulmonary:       Neurologic:       Cardiovascular: Comment: [facility] 2023   Final Conclusion    1. Normal left ventricular chamber size. Normal left ventricular systolic function. Calculated   left ventricular ejection fraction (modified    Riggs technique) is 57 %. No regional wall motion abnormalities.  Normal left ventricular   wall thickness.    2. Findings consistent with normal left ventricular filling pressure.    3. Normal right ventricular chamber size. Normal right ventricular systolic function.    4. Normal inferior vena cava size with normal inspiratory collapse.    5. No significant valvular heart disease.    6. There were no prior studies available for comparison.    Estimated EF: 55-60%       (+)  hypertension- -   -  - -                                      METS/Exercise Tolerance:     Hematologic:       Musculoskeletal:       GI/Hepatic:       Renal/Genitourinary:       Endo:       Psychiatric/Substance Use:     (+) psychiatric history anxiety and depression alcohol abuse      Infectious Disease:       Malignancy:       Other:      (+)  , H/O Chronic Pain,         Physical Exam    Airway  airway exam normal     " "  TM distance: > 3 FB   Neck ROM: full     Respiratory Devices and Support         Dental  no notable dental history         Cardiovascular   cardiovascular exam normal          Pulmonary   pulmonary exam normal                OUTSIDE LABS:  CBC:   Lab Results   Component Value Date    WBC 4.6 07/30/2008    WBC 6.9 07/29/2008    HGB 14.0 07/30/2008    HGB 13.0 (L) 07/29/2008    HCT 43.1 07/30/2008    HCT 38.6 (L) 07/29/2008     07/30/2008     07/29/2008     BMP:   Lab Results   Component Value Date     07/30/2008     07/29/2008    POTASSIUM 4.0 07/30/2008    POTASSIUM 4.3 07/29/2008    CHLORIDE 101 07/30/2008    CHLORIDE 103 07/29/2008    CO2 29 07/30/2008    CO2 29 07/29/2008    BUN 11 07/30/2008    BUN 12 07/29/2008    CR 0.72 07/30/2008    CR 0.78 07/29/2008     (H) 12/07/2023     (H) 07/30/2008     COAGS:   Lab Results   Component Value Date    PTT 46 (H) 07/29/2008    INR 1.04 07/29/2008     POC: No results found for: \"BGM\", \"HCG\", \"HCGS\"  HEPATIC: No results found for: \"ALBUMIN\", \"PROTTOTAL\", \"ALT\", \"AST\", \"GGT\", \"ALKPHOS\", \"BILITOTAL\", \"BILIDIRECT\", \"KRISHNA\"  OTHER:   Lab Results   Component Value Date    ROSALIO 9.0 07/30/2008    MAG 2.3 07/30/2008       Anesthesia Plan    ASA Status:  2    NPO Status:  NPO Appropriate    Anesthesia Type: General.     - Airway: ETT   Induction: Intravenous.   Maintenance: Balanced.        Consents    Anesthesia Plan(s) and associated risks, benefits, and realistic alternatives discussed. Questions answered and patient/representative(s) expressed understanding.     - Discussed:     - Discussed with:  Patient            Postoperative Care       PONV prophylaxis: Ondansetron (or other 5HT-3), Background Propofol Infusion, Dexamethasone or Solumedrol     Comments:               Javid Mccormick MD    I have reviewed the pertinent notes and labs in the chart from the past 30 days and (re)examined the patient.  Any updates or changes from " "those notes are reflected in this note.              # Overweight: Estimated body mass index is 25.18 kg/m  as calculated from the following:    Height as of this encounter: 1.727 m (5' 8\").    Weight as of this encounter: 75.1 kg (165 lb 9.6 oz).      "

## 2023-12-07 NOTE — ANESTHESIA PROCEDURE NOTES
Airway       Patient location during procedure: OR       Procedure Start/Stop Times: 12/7/2023 12:11 PM  Staff -        CRNA: Roberta Dunbar APRN CRNA       Performed By: CRNA  Consent for Airway        Urgency: elective  Indications and Patient Condition       Indications for airway management: anton-procedural and airway protection       Induction type:intravenous       Mask difficulty assessment: 1 - vent by mask    Final Airway Details       Final airway type: endotracheal airway       Successful airway: ETT - single  Endotracheal Airway Details        ETT size (mm): 7.5       Cuffed: yes       Cuff volume (mL): 6       Successful intubation technique: direct laryngoscopy       DL Blade Type: MAC 3       Grade View of Cords: 1       Adjucts: stylet       Position: Right       Measured from: lips       Secured at (cm): 22       Bite block used: Soft    Post intubation assessment        Placement verified by: capnometry, equal breath sounds and chest rise        Number of attempts at approach: 1       Number of other approaches attempted: 0       Secured with: tape       Ease of procedure: easy       Dentition: Intact and Unchanged    Medication(s) Administered   Medication Administration Time: 12/7/2023 12:11 PM

## 2023-12-08 ENCOUNTER — APPOINTMENT (OUTPATIENT)
Dept: OCCUPATIONAL THERAPY | Facility: CLINIC | Age: 58
End: 2023-12-08
Attending: ORTHOPAEDIC SURGERY
Payer: COMMERCIAL

## 2023-12-08 ENCOUNTER — APPOINTMENT (OUTPATIENT)
Dept: PHYSICAL THERAPY | Facility: CLINIC | Age: 58
End: 2023-12-08
Attending: PHYSICIAN ASSISTANT
Payer: COMMERCIAL

## 2023-12-08 LAB — HGB BLD-MCNC: 11.4 G/DL (ref 13.3–17.7)

## 2023-12-08 PROCEDURE — 97166 OT EVAL MOD COMPLEX 45 MIN: CPT | Mod: GO

## 2023-12-08 PROCEDURE — 97161 PT EVAL LOW COMPLEX 20 MIN: CPT | Mod: GP

## 2023-12-08 PROCEDURE — 85018 HEMOGLOBIN: CPT

## 2023-12-08 PROCEDURE — 99231 SBSQ HOSP IP/OBS SF/LOW 25: CPT | Performed by: HOSPITALIST

## 2023-12-08 PROCEDURE — 250N000013 HC RX MED GY IP 250 OP 250 PS 637: Performed by: HOSPITALIST

## 2023-12-08 PROCEDURE — 97110 THERAPEUTIC EXERCISES: CPT | Mod: GP

## 2023-12-08 PROCEDURE — 258N000003 HC RX IP 258 OP 636: Performed by: PHYSICIAN ASSISTANT

## 2023-12-08 PROCEDURE — 250N000013 HC RX MED GY IP 250 OP 250 PS 637: Performed by: PHYSICIAN ASSISTANT

## 2023-12-08 PROCEDURE — 36415 COLL VENOUS BLD VENIPUNCTURE: CPT

## 2023-12-08 PROCEDURE — 97116 GAIT TRAINING THERAPY: CPT | Mod: GP

## 2023-12-08 PROCEDURE — 120N000001 HC R&B MED SURG/OB

## 2023-12-08 PROCEDURE — 97535 SELF CARE MNGMENT TRAINING: CPT | Mod: GO

## 2023-12-08 PROCEDURE — 250N000011 HC RX IP 250 OP 636: Performed by: PHYSICIAN ASSISTANT

## 2023-12-08 RX ORDER — ACETAMINOPHEN 325 MG/1
650 TABLET ORAL EVERY 4 HOURS PRN
Qty: 100 TABLET | Refills: 0 | Status: SHIPPED | OUTPATIENT
Start: 2023-12-10

## 2023-12-08 RX ORDER — OXYCODONE HYDROCHLORIDE 5 MG/1
5 TABLET ORAL EVERY 4 HOURS PRN
Qty: 30 TABLET | Refills: 0 | Status: SHIPPED | OUTPATIENT
Start: 2023-12-08

## 2023-12-08 RX ADMIN — SENNOSIDES AND DOCUSATE SODIUM 1 TABLET: 8.6; 5 TABLET ORAL at 20:18

## 2023-12-08 RX ADMIN — OXYCODONE HYDROCHLORIDE 10 MG: 5 TABLET ORAL at 09:07

## 2023-12-08 RX ADMIN — ACETAMINOPHEN 975 MG: 325 TABLET ORAL at 13:17

## 2023-12-08 RX ADMIN — CEFAZOLIN 1 G: 1 INJECTION, POWDER, FOR SOLUTION INTRAMUSCULAR; INTRAVENOUS at 03:33

## 2023-12-08 RX ADMIN — ACETAMINOPHEN 975 MG: 325 TABLET ORAL at 01:48

## 2023-12-08 RX ADMIN — HYDROCHLOROTHIAZIDE 12.5 MG: 12.5 CAPSULE ORAL at 09:08

## 2023-12-08 RX ADMIN — ESCITALOPRAM OXALATE 10 MG: 10 TABLET ORAL at 09:08

## 2023-12-08 RX ADMIN — PREGABALIN 75 MG: 75 CAPSULE ORAL at 09:08

## 2023-12-08 RX ADMIN — PREGABALIN 75 MG: 75 CAPSULE ORAL at 20:18

## 2023-12-08 RX ADMIN — SENNOSIDES AND DOCUSATE SODIUM 1 TABLET: 8.6; 5 TABLET ORAL at 09:09

## 2023-12-08 RX ADMIN — OXYCODONE HYDROCHLORIDE 10 MG: 5 TABLET ORAL at 13:18

## 2023-12-08 RX ADMIN — LISINOPRIL 40 MG: 40 TABLET ORAL at 09:08

## 2023-12-08 RX ADMIN — SODIUM CHLORIDE: 9 INJECTION, SOLUTION INTRAVENOUS at 03:34

## 2023-12-08 RX ADMIN — HYDROXYZINE HYDROCHLORIDE 25 MG: 25 TABLET ORAL at 23:33

## 2023-12-08 RX ADMIN — ACETAMINOPHEN 975 MG: 325 TABLET ORAL at 20:18

## 2023-12-08 RX ADMIN — POLYETHYLENE GLYCOL 3350 17 G: 17 POWDER, FOR SOLUTION ORAL at 09:09

## 2023-12-08 RX ADMIN — OXYCODONE HYDROCHLORIDE 5 MG: 5 TABLET ORAL at 23:33

## 2023-12-08 RX ADMIN — THERA TABS 1 TABLET: TAB at 09:09

## 2023-12-08 ASSESSMENT — ACTIVITIES OF DAILY LIVING (ADL)
ADLS_ACUITY_SCORE: 22
IADL_COMMENTS: FAMILY WILL DO UNTIL PT IS RECOVERED
ADLS_ACUITY_SCORE: 26
ADLS_ACUITY_SCORE: 22
ADLS_ACUITY_SCORE: 26
ADLS_ACUITY_SCORE: 26
ADLS_ACUITY_SCORE: 22
ADLS_ACUITY_SCORE: 22
ADLS_ACUITY_SCORE: 26
ADLS_ACUITY_SCORE: 26

## 2023-12-08 NOTE — DISCHARGE SUMMARY
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 Eng on . There were no intraoperative complications and the patient was transferred to the recovery room and later the orthopedic unit in stable condition. Once the patient reached the orthopedic floor our orthopedic pain protocol was implemented along with the following:    Anticoagulation Medications: None  Therapy: PT and OT  Activity: WBAT  Bracing: None    Consultations during Admission: Hospitalist service for medical management     COMPLICATIONS/SIGNIFICANT FINDINGS    NONE    DISCHARGE INFORMATION   Condition at discharge: Good  Discharge destination: Home  Patient was seen by myself on the date of discharge.    FOLLOW UP CARE   Follow up with orthopedics in 2 weeks or sooner should the need arise. Ortho will continue to manage pain control, post op anticoagulation and incision care.     Follow up with your PCP for management of chronic medical problems and to evaluate for post op medical complications including constipation, nausea/vomiting, DVT/PE, anemia, changes in blood pressure, fevers/chills, urinary retention and atelectasis/pneumonia.     Subjective   Patient is doing well on POD #1. Pain is well controlled with oral medications. Ambulating. Tolerating oral intake.     Physical Exam   BP (!) 139/90 (BP Location: Right arm)   Pulse 77   Temp 97.9  F (36.6  C)  "(Oral)   Resp 15   Ht 1.727 m (5' 8\")   Wt 75.1 kg (165 lb 9.6 oz)   SpO2 94%   BMI 25.18 kg/m    The patient is A&Ox3. Appears comfortable.   Sensation is intact.  Dorsiflexion and plantar flexion is intact.  Dorsalis pedis pulse intact.  Calves are soft and non-tender. Negative Racquel's.  The incision is covered. Dressing C/D/I.    Pertinent Results at Discharge     Hemoglobin   Date/Time Value Ref Range Status   12/08/2023 10:16 AM 11.4 (L) 13.3 - 17.7 g/dL Final   07/30/2008 05:24 AM 14.0 13.3 - 17.7 g/dL Final   07/29/2008 02:44 AM 13.0 (L) 13.3 - 17.7 g/dL Final   07/28/2008 09:54 PM 13.9 13.3 - 17.7 g/dL Final     INR   Date/Time Value Ref Range Status   07/29/2008 02:44 AM 1.04 0.86 - 1.14 Final   07/28/2008 12:59 PM 0.91 0.86 - 1.14 Final     Platelet Count   Date/Time Value Ref Range Status   07/30/2008 05:24  150 - 450 10e9/L Final   07/29/2008 02:44  150 - 450 10e9/L Final   07/28/2008 09:54  150 - 450 10e9/L Final       Problem List   Principal Problem:    Lumbar back pain      Michelle Dunham PA-C/Dr. Eng  [facility]  [phone]  Date: 12/8/2023  Time: 12:17 PM   "

## 2023-12-08 NOTE — PROGRESS NOTES
12/08/23 0920   Appointment Info   Signing Clinician's Name / Credentials (PT) Bisi Valdez, PT, DPT   Living Environment   People in Home alone   Current Living Arrangements house   Home Accessibility stairs to enter home   Number of Stairs, Main Entrance 2   Living Environment Comments Able to stay on main level. Parents will be staying with patient at discharge   Self-Care   Equipment Currently Used at Home none   Fall history within last six months no   General Information   Onset of Illness/Injury or Date of Surgery 12/07/23   Referring Physician Dr. Jd Eng   Patient/Family Therapy Goals Statement (PT) Walk without pain   Pertinent History of Current Problem (include personal factors and/or comorbidities that impact the POC) S/P anterior L5-S1 fusion   Existing Precautions/Restrictions spinal;brace worn when out of bed   Weight-Bearing Status - LLE weight-bearing as tolerated   Weight-Bearing Status - RLE weight-bearing as tolerated   Range of Motion (ROM)   ROM Comment WFL   Bed Mobility   Bed Mobility sit-supine   Sit-Supine [place] (Bed Mobility) contact guard;verbal cues   Transfers   Transfers sit-stand transfer   Maintains Weight-bearing Status (Transfers) able to maintain   Sit-Stand Transfer   Sit-Stand [place] (Transfers) supervision   Assistive Device (Sit-Stand Transfers)   (None)   Gait/Stairs (Locomotion)   [place] Level (Gait) contact guard;verbal cues   Assistive Device (Gait)   (None)   Distance in Feet (Gait) 10   Pattern (Gait) step-through   Deviations/Abnormal Patterns (Gait) nestor decreased   Maintains Weight-bearing Status (Gait) able to maintain   Clinical Impression   Criteria for Skilled Therapeutic Intervention Yes, treatment indicated   PT Diagnosis (PT) impaired functional mobility   Influenced by the following impairments pain, weakness   Functional limitations due to impairments gait, stairs, transfers   Clinical Presentation (PT Evaluation  Complexity) stable   Clinical Presentation Rationale pt presents as medically diagnosed   Clinical Decision Making (Complexity) low complexity   Planned Therapy Interventions (PT) bed mobility training;gait training;home exercise program;patient/family education;stair training;strengthening;transfer training   Risk & Benefits of therapy have been explained care plan/treatment goals reviewed;patient   PT Total Evaluation Time   PT Eval, Low Complexity Minutes (84686) 10   Physical Therapy Goals   PT Frequency One time eval and treatment only   PT Predicted Duration/Target Date for Goal Attainment 12/08/23   PT Goals PT Goal 1;Gait;Transfers   PT: Transfers Supervision/stand-by assist;Sit to/from stand;Goal Met   PT: Gait Supervision/stand-by assist;Goal Met;100 feet   PT: Goal 1 Independent with spine exercises  (Goal met)   Interventions   Interventions Quick Adds Therapeutic Procedure;Therapeutic Activity;Gait Training   Therapeutic Procedure/Exercise   Ther. Procedure: strength, endurance, ROM, flexibillity Minutes (03469) 10   Symptoms Noted During/After Treatment fatigue   Treatment Detail/Skilled Intervention Reclined and Standing spine protocol therex x10 reps with bilateral LEs, and bilateral UE support, verbal cueing and demonstration for technique and posture, Independent with written HEP following education   Therapeutic Activity   Symptoms Noted During/After Treatment Fatigue   Treatment Detail/Skilled Intervention sit to/from stand, cueing for safe hand placement,  reviewed spinal precautions   Gait Training   Gait Training Minutes (17631) 8   Symptoms Noted During/After Treatment (Gait Training) fatigue   Treatment Detail/Skilled Intervention verbal cueing for posture. Patient reports confidence in ambulation without FWW. Education on changing positions frequently and ambulation progression at home. Stairs: 4 stairs with right rail, SBA, nonreciprocal pattern, verbal cueing for safety and patterning    Distance in Feet 100, 125   [place] Level (Gait Training) stand-by assist   Physical Assistance Level (Gait Training) verbal cues   Weight Bearing (Gait Training) full weight-bearing   Assistive Device (Gait Training)   (None)   Pattern Analysis (Gait Training) swing-through gait   Gait Analysis Deviations decreased nestor;increased stride width;decreased step length   Impairments (Gait Analysis/Training) strength decreased   PT Discharge Planning   PT Discharge Recommendation (DC Rec) (S)  home with assist   PT Rationale for DC Rec Patient ambulating and negotiating stairs safely, support from parents at home   PT Brief overview of current status Amb 100ft with no AD, SBA   PT Equipment Needed at Discharge walker, rolling   Total Session Time   Timed Code Treatment Minutes 18   Total Session Time (sum of timed and untimed services) 28

## 2023-12-08 NOTE — PROGRESS NOTES
Occupational Therapy Discharge Summary    Reason for therapy discharge:    All goals and outcomes met, no further needs identified.    Progress towards therapy goal(s). See goals on Care Plan in [facility] electronic health record for goal details.  Goals met    Therapy recommendation(s):    No further therapy is recommended.

## 2023-12-08 NOTE — OP NOTE
OPERATIVE REPORT    Rolo Arevalo  [facility]  Medical Record #:  [id number]  YOB: 1965  Age:  58 year old    PROCEDURE DATE:  12/7/2023    PREOPERATIVE DIAGNOSIS: Degenerative disc disease decision for anterior spinal fusion L5-S1 spinal level    POSTOPERATIVE DIAGNOSIS: Same    PROCEDURE: Anterior retroperitoneal exposure 1 level spinal fusion surgery L5-S1 spinal level    OPERATING SURGEON:  Cristobal Retana MD    ASSISTANT: Dr. Eng co-surgeon    ANESTHESIA: General    DESCRIPTION OF PROCEDURE: With the patient in the supine position under general anesthesia having reviewed the risk benefits and complications of surgical intervention with him the abdomen is prepped and draped in usual sterile fashion.  Midline incision was made left retroperitoneal space under vessel structure retracted above and at the right side.  Only blunt dissection is used until the immediate presacral space was entered.  The sacral vessels are divided adequate mobilization obtain radiographic control used to identify appropriate interspace and midline.  With intermittent traction on the vasculature the spinal fusion is complete L5-S1 spinal level by Dr. Eng.  Please see separate report.  At the completion of the procedure visceral structures were returned to normal atomic position and good arterial flow was noted in the aorta and iliac systems as it was throughout the operative intervention.  Time was taken to ensure hemostasis was obtained and the midline wound was closed with running looped PDS and the fascial layer and skin closed with interrupted 2-0 Vicryl and running subcuticular suture.  The estimated blood loss was minimal and there were no complications and the patient tolerated the procedure well.  Sponge needle counts are correct x 2.    EBL:  [unfilled]    SPECIMENS:  * No specimens in log *    Cristobal retana md  [facility] [place]

## 2023-12-08 NOTE — PLAN OF CARE
Problem: Adult Inpatient Plan of Care  Goal: Optimal Comfort and Wellbeing  Outcome: Progressing  Intervention: Monitor Pain and Promote Comfort  Recent Flowsheet Documentation  Taken 12/8/2023 0026 by Ctoy Womack RN  Pain Management Interventions: medication (see MAR)     Problem: Adult Inpatient Plan of Care  Goal: Optimal Comfort and Wellbeing  Intervention: Monitor Pain and Promote Comfort  Recent Flowsheet Documentation  Taken 12/8/2023 0026 by Coty Womack RN  Pain Management Interventions: medication (see MAR)      Patient vital signs are at baseline: Yes  Patient able to ambulate as they were prior to admission or with assist devices provided by therapies during their stay:  Yes  Patient MUST void prior to discharge:  No,  Reason:  Liang catheter removed this AM due to void.   Patient able to tolerate oral intake:  Yes  Pain has adequate pain control using Oral analgesics:  Yes  Does patient have an identified :  Yes  Has goal D/C date and time been discussed with patient:  Yes       A/O X4, VSS on RA. Dressing CDI. CMS intact. Pain managed well with PRN oxycodone and atarax. Tolerated IV abx well. IV SL. Ambulated during shift. Call light within reach of bed, and bed alarm activated for safety.

## 2023-12-08 NOTE — PROGRESS NOTES
S: Pt is a 58 yom who was seen at [facility], room 3102, for delivery of an Apsen quik draw Rap LSO prescribed by Dr. Eng.  DX:  s/p L5-S1 ALIF  O: 5 8 . 165 lbs.  A: Waist measurement taken of patient and a size medium Apsen quik draw Rap LSO was fit appropriately to the patient. Donning/doffing and wear/care instructions were discussed with the patient. Written instructions were also delivered to the patient. Pt was satisfied with fit and function of device.  P: Pt to follow-up as needed. All questions were answered at this time.  G: Provide hydrostatic compression to stabilize surgical site and relieve lumbar pain.    Electronically signed by Emma Machado CPO

## 2023-12-08 NOTE — CONSULTS
Care Management Initial Consult    General Information  Assessment completed with: Patient,    Type of CM/SW Visit: Initial Assessment    Primary Care Provider verified and updated as needed:     Readmission within the last 30 days: no previous admission in last 30 days      Reason for Consult: discharge planning  Advance Care Planning:     no       Communication Assessment  Patient's communication style: spoken language (English or Bilingual)    Hearing Difficulty or Deaf: no   Wear Glasses or Blind: yes    Cognitive  Cognitive/Neuro/Behavioral: WDL  Level of Consciousness: alert  Arousal Level: opens eyes spontaneously     Mood/Behavior: calm     Speech: clear    Living Environment:   People in home: alone     Current living Arrangements: house      Able to return to prior arrangements: yes       Family/Social Support:  Care provided by: parent(s)  Provides care for: no one  Marital Status:   Parent(s)          Description of Support System: Supportive    Support Assessment: Adequate family and caregiver support    Current Resources:   Patient receiving home care services: No     Community Resources: None  Equipment currently used at home: none  Supplies currently used at home: None     Financial Concerns:   none reported       Does the patient's insurance plan have a 3 day qualifying hospital stay waiver?  No    Lifestyle & Psychosocial Needs:  Social Determinants of Health     Food Insecurity: Not on file   Depression: Not on file   Housing Stability: Not on file   Tobacco Use: Low Risk  (12/7/2023)    Patient History     Smoking Tobacco Use: Never     Smokeless Tobacco Use: Never     Passive Exposure: Not on file   Financial Resource Strain: Not on file   Alcohol Use: Not on file   Transportation Needs: Not on file   Physical Activity: Not on file   Interpersonal Safety: Not on file   Stress: Not on file   Social Connections: Not on file       Functional Status:  Prior to admission patient needed  assistance: no             Mental Health Status:        No needs reported     Chemical Dependency Status:        No needs reported        Values/Beliefs:  Spiritual, Cultural Beliefs, Jewish Practices, Values that affect care:       none reported          Additional Information:  Assessed with patient. Patient reports he lives in single family home with his dog. Patient reports that his parents have come to stay with him while he heals and provide family support. Patient anticipates receiving transportation from the hospital from his parents. Patient reports he has a walker and a friend who can lend him a reacher. Patient reports that he does not anticipate needing home care but has used outpatient PT/OT at [facility] in [place]. Patient does not anticipate any other CM/SW needs.    Alma Parks, JACY  10:27 AM   12/08/23

## 2023-12-08 NOTE — PLAN OF CARE
Problem: Adult Inpatient Plan of Care  Goal: Absence of Hospital-Acquired Illness or Injury  Intervention: Identify and Manage Fall Risk  Recent Flowsheet Documentation  Taken 12/8/2023 0735 by Jes Orellana RN  Safety Promotion/Fall Prevention: safety round/check completed     Problem: Adult Inpatient Plan of Care  Goal: Absence of Hospital-Acquired Illness or Injury  Intervention: Identify and Manage Fall Risk  Recent Flowsheet Documentation  Taken 12/8/2023 0735 by Jes Orellana RN  Safety Promotion/Fall Prevention: safety round/check completed     Problem: Adult Inpatient Plan of Care  Goal: Absence of Hospital-Acquired Illness or Injury  Intervention: Identify and Manage Fall Risk  Recent Flowsheet Documentation  Taken 12/8/2023 0735 by Jes Orellana RN  Safety Promotion/Fall Prevention: safety round/check completed     Problem: Adult Inpatient Plan of Care  Goal: Absence of Hospital-Acquired Illness or Injury  Intervention: Identify and Manage Fall Risk  Recent Flowsheet Documentation  Taken 12/8/2023 0735 by Jes Orellana RN  Safety Promotion/Fall Prevention: safety round/check completed     Problem: Adult Inpatient Plan of Care  Goal: Absence of Hospital-Acquired Illness or Injury  Intervention: Identify and Manage Fall Risk  Recent Flowsheet Documentation  Taken 12/8/2023 0735 by Jes Orellana RN  Safety Promotion/Fall Prevention: safety round/check completed     Problem: Adult Inpatient Plan of Care  Goal: Absence of Hospital-Acquired Illness or Injury  Intervention: Identify and Manage Fall Risk  Recent Flowsheet Documentation  Taken 12/8/2023 0735 by Jes Orellana RN  Safety Promotion/Fall Prevention: safety round/check completed     Problem: Adult Inpatient Plan of Care  Goal: Absence of Hospital-Acquired Illness or Injury  Intervention: Identify and Manage Fall Risk  Recent Flowsheet Documentation  Taken 12/8/2023 0735 by Jes Orellana RN  Safety  Promotion/Fall Prevention: safety round/check completed     Problem: Adult Inpatient Plan of Care  Goal: Absence of Hospital-Acquired Illness or Injury  Intervention: Identify and Manage Fall Risk  Recent Flowsheet Documentation  Taken 12/8/2023 0735 by Jes Orellana RN  Safety Promotion/Fall Prevention: safety round/check completed     Problem: Adult Inpatient Plan of Care  Goal: Absence of Hospital-Acquired Illness or Injury  Intervention: Identify and Manage Fall Risk  Recent Flowsheet Documentation  Taken 12/8/2023 0735 by Jes Orellana RN  Safety Promotion/Fall Prevention: safety round/check completed     Problem: Spinal Surgery  Goal: Absence of Bleeding  Outcome: Progressing   Goal Outcome Evaluation:        Dressing over abdominal incision appears clean, dry and intact. However, ecchymosis distal to the incision is significant. This morning ecchymosis was most apparent in the pelvic region, over the pubis, and the proximal inch of the penis. At this writing, 7 hours later, the entire penis and some of scrotum is ecchymotic. Ortho and [facility] were notified, and both examined patient. He will stay overnight, and Urology will be consulted. Currently he denies any difficulties voiding, and he just voided 600ml, with PVR of one ml. He denies any discomfort, pressure, or concerns. Parents also with patient, all are accepting of decision to stay overnight. Will continue to monitor. Hemoglobin this morning was 11.4 .

## 2023-12-08 NOTE — PROGRESS NOTES
"Orthopedic Progress Note      Assessment: 1 Day Post-Op  S/P Procedure(s):  LUMBAR 5-SACRAL 1 ANTERIOR LUMBAR INTERBODY FUSION  SURGICAL EXPOSURE, ANTERIOR APPROACH, WITH WOUND CLOSURE, FOR LUMBAR SPINE SURGERY, BY GENERAL SURGERY @    Plan:   - Continue PT/OT  - Weightbearing status: WBAT   - No bending, twisting or lifting more than 10 pounds for 6 weeks.  - Anticoagulation: no chemical prophylaxis in addition to SCDs, aykaa stockings and early ambulation.  - Pain control at discharge: Oxycodone 5 mg 1-2 tabs Q4-6 hours x30-32 tabs, Hydroxyzine 25 mg QID PRN, Gabapentin 100 mg TID, Tizanidine 4 mg TID, Acetaminophen 1000 mg TID  - Discharge planning: pvr, pain control and progression in therapy.  Could discharge this evening      Subjective:  Pain: minimal  Chest pain, SOB: no  Nausea, Vomiting:  no  Lightheadedness, Dizziness:  no  Neuro:  Patient denies new onset numbness or paresthesias    Patient is doing well today and voiding, ambulating, passing gas and tolerating oral intake.  Has passed therapy.  Liang was pulled this morning has not voided yet is due to void.  If passes PVRs today could go home this afternoon versus evening versus tomorrow    Objective:  BP (!) 139/90 (BP Location: Right arm)   Pulse 77   Temp 97.9  F (36.6  C) (Oral)   Resp 15   Ht 1.727 m (5' 8\")   Wt 75.1 kg (165 lb 9.6 oz)   SpO2 94%   BMI 25.18 kg/m    The patient is A&Ox3. Appears comfortable.   Sensation is intact.  Dorsiflexion and plantar flexion is intact.  Dorsalis pedis pulse intact.  Calves are soft and non-tender. Negative Racquel's.  The incision is covered. Dressing C/D/I.      Pertinent Labs   Lab Results: personally reviewed.   Lab Results   Component Value Date    INR 1.04 07/29/2008    INR 0.91 07/28/2008     Lab Results   Component Value Date    WBC 4.6 07/30/2008    HGB 14.0 07/30/2008    HCT 43.1 07/30/2008    MCV 92 07/30/2008     07/30/2008     Lab Results   Component Value Date     07/30/2008    " CO2 29 07/30/2008         Report completed by:  Michelle Dunham PA-C/Dr. Eng  [facility]    Date: 12/8/2023  Time: 9:48 AM

## 2023-12-08 NOTE — PROGRESS NOTES
"[facility]    Medicine Progress Note - Hospitalist Service    Date of Admission:  12/7/2023    Assessment & Plan   Rolo Arevalo is a 58 year old male admitted s/p decortication and L5/S1 anterior spinal fusion.  He is clinically stable.  Recommendations as below.  Our service will sign off.  Please contact us for subsequent clinical questions.    # s/p decortication and L5/S1  - per orthopaedics     # HTN  - lisinopril  - hydrochlorothiazide    Addendum:  Notified by nursing that patient developed bruising at genitals.  Patient is in no extremis on exam.  He has ecchymosis in the suprapubic region as well as in the shaft of penis and base of scrotum.  There is associated swelling of the penis.  This could represent bleeding given nature of the surgical incision and continuity of the fascial planes.  Urology consulted for further evaluation.    Discussed with orthopaedics.  Ecchymosis is not specifically concerning.  They advised ice and elevation and will plan to evaluate the patient tomorrow.          Diet: Advance Diet as Tolerated: Regular Diet Adult    Liang Catheter: Not present  Lines: None     Cardiac Monitoring: None  Code Status: Full Code      Clinically Significant Risk Factors Present on Admission                  # Hypertension: Home medication list includes antihypertensive(s)      # Overweight: Estimated body mass index is 25.18 kg/m  as calculated from the following:    Height as of this encounter: 1.727 m (5' 8\").    Weight as of this encounter: 75.1 kg (165 lb 9.6 oz).              Disposition Plan     Expected Discharge Date: 12/09/2023                    Kvng Garrett MD  Hospitalist Service  [facility]  Securely message with Jillian (more info)  Text page via [employer] Paging/Directory   ______________________________________________________________________    Interval History   Reports numbness in his left foot, which was present prior to " Patient was not available for the therapy session at this time.  Reason not seen: Patient requesting no therapy today (02/15/19 1342).     Re-Attempt Plan: Will re-attempt per established treatment plan (02/15/19 1342). Pt states she is discharging soon and would like 'to get my things in order before I go'     surgery.  Reports abdominal tightness.  He is tolerating a diet and passing flatus.  Has not urinated.    Physical Exam   Vital Signs: Temp: 97.9  F (36.6  C) Temp src: Oral BP: (!) 139/90 Pulse: 77   Resp: 15 SpO2: 94 % O2 Device: None (Room air) Oxygen Delivery: 2 LPM  Weight: 165 lbs 9.6 oz    Gen:  sitting in chair in no extremis  Neuro: alert, conversant  CV:  nl rate, regular rhythm  Pulm: no acute resp distress, ctab anteriorly  GI:  no tenderness to palpation of the abdomen outside the surgical dressing    Medical Decision Making             Data

## 2023-12-08 NOTE — PROGRESS NOTES
12/08/23 0830   Appointment Info   Signing Clinician's Name / Credentials (OT) Caren Michael, OTR/L   Living Environment   People in Home alone  (parents will stay with him for a while-has friends who can help too)   Current Living Arrangements house   Living Environment Comments has walk in shower with chair, access to a reacher, standard toilet with vanity   Self-Care   Usual Activity Tolerance moderate   Current Activity Tolerance moderate   Activity/Exercise/Self-Care Comment Pt was independent with ADLS and IADLS prior to admit   Instrumental Activities of Daily Living (IADL)   IADL Comments Family will do until pt is recovered   General Information   Onset of Illness/Injury or Date of Surgery 12/07/23   Referring Physician Jd Rosenthal   Patient/Family Therapy Goal Statement (OT) heal well and get my life back.  i haven't worked in 3 years   Additional Occupational Profile Info/Pertinent History of Current Problem Rolo Arevalo is a 58 year old male admitted s/p decortication and L5/S1 anterior spinal fusion   Existing Precautions/Restrictions fall;no pivoting or twisting;lifting;spinal   Cognitive Status Examination   Affect/Mental Status (Cognitive) WNL   Range of Motion Comprehensive   General Range of Motion no range of motion deficits identified   Strength Comprehensive (MMT)   General Manual Muscle Testing (MMT) Assessment no strength deficits identified   Bed Mobility   Comment (Bed Mobility) cga   Transfers   Transfer Comments cga   Activities of Daily Living   BADL Assessment/Intervention upper body dressing;lower body dressing;toileting   Upper Body Dressing Assessment/Training   Faxon Level (Upper Body Dressing) contact guard assist   Lower Body Dressing Assessment/Training   Faxon Level (Lower Body Dressing) contact guard assist   Toileting   Faxon Level (Toileting) contact guard assist   Clinical Impression   Criteria for Skilled Therapeutic Interventions Met (OT) Yes,  treatment indicated   OT Diagnosis Decreased independence with adls   OT Problem List-Impairments impacting ADL post-surgical precautions;pain   Assessment of Occupational Performance 3-5 Performance Deficits   Identified Performance Deficits dressing, transfers, bed mobility, toileting   Planned Therapy Interventions (OT) ADL retraining;bed mobility training;transfer training;home program guidelines   Clinical Decision Making Complexity (OT) detailed assessment/moderate complexity   Risk & Benefits of therapy have been explained evaluation/treatment results reviewed;care plan/treatment goals reviewed;risks/benefits reviewed;current/potential barriers reviewed;participants voiced agreement with care plan;participants included;patient   OT Total Evaluation Time   OT Eval, Moderate Complexity Minutes (38046) 15   OT Goals   Therapy Frequency (OT) One time eval and treatment   OT Goals Upper Body Dressing;Lower Body Dressing;Bed Mobility;Toilet Transfer/Toileting   OT: Upper Body Dressing Modified independent;within precautions;Goal Met;Completed   OT: Lower Body Dressing Modified independent;within precautions;Goal Met;Completed   OT: Bed Mobility Modified independent;supine to/from sitting;rolling;bridging;Goal Met;within precautions;Completed   OT: Toilet Transfer/Toileting Modified independent;toilet transfer;cleaning and garment management;within precautions;Goal Met;Completed   Interventions   Interventions Quick Adds Self-Care/Home Management   Self-Care/Home Management   Self-Care/Home Mgmt/ADL, Compensatory, Meal Prep Minutes (44260) 30   Symptoms Noted During/After Treatment (Meal Preparation/Planning Training) none   Treatment Detail/Skilled Intervention Taught pt how to perform adls following spine precautions: total body dressing, including ice , transfers to and from bed, chair, toilet, car, and bed mobility using the log roll.  Pt mod I with all after OT intervention.  Gave handouts and answered  all questions.   OT Discharge Planning   OT Plan dc OT   OT Discharge Recommendation (DC Rec) (S)  home   OT Rationale for DC Rec Pt has good support at home   OT Brief overview of current status Pt is mod I with basic adls and functional mobility after OT intervention while following correct body mechanics   Total Session Time   Timed Code Treatment Minutes 30   Total Session Time (sum of timed and untimed services) 45

## 2023-12-09 VITALS
OXYGEN SATURATION: 98 % | HEIGHT: 68 IN | HEART RATE: 72 BPM | DIASTOLIC BLOOD PRESSURE: 78 MMHG | RESPIRATION RATE: 16 BRPM | BODY MASS INDEX: 25.1 KG/M2 | TEMPERATURE: 98.2 F | SYSTOLIC BLOOD PRESSURE: 128 MMHG | WEIGHT: 165.6 LBS

## 2023-12-09 LAB — HGB BLD-MCNC: 11.4 G/DL (ref 13.3–17.7)

## 2023-12-09 PROCEDURE — 250N000013 HC RX MED GY IP 250 OP 250 PS 637: Performed by: HOSPITALIST

## 2023-12-09 PROCEDURE — 36415 COLL VENOUS BLD VENIPUNCTURE: CPT | Performed by: STUDENT IN AN ORGANIZED HEALTH CARE EDUCATION/TRAINING PROGRAM

## 2023-12-09 PROCEDURE — 85018 HEMOGLOBIN: CPT | Performed by: STUDENT IN AN ORGANIZED HEALTH CARE EDUCATION/TRAINING PROGRAM

## 2023-12-09 PROCEDURE — 250N000013 HC RX MED GY IP 250 OP 250 PS 637: Performed by: PHYSICIAN ASSISTANT

## 2023-12-09 RX ADMIN — PREGABALIN 75 MG: 75 CAPSULE ORAL at 09:03

## 2023-12-09 RX ADMIN — SENNOSIDES AND DOCUSATE SODIUM 1 TABLET: 8.6; 5 TABLET ORAL at 09:03

## 2023-12-09 RX ADMIN — LISINOPRIL 40 MG: 40 TABLET ORAL at 09:03

## 2023-12-09 RX ADMIN — ACETAMINOPHEN 975 MG: 325 TABLET ORAL at 04:22

## 2023-12-09 RX ADMIN — HYDROCHLOROTHIAZIDE 12.5 MG: 12.5 CAPSULE ORAL at 09:03

## 2023-12-09 RX ADMIN — THERA TABS 1 TABLET: TAB at 09:03

## 2023-12-09 RX ADMIN — ACETAMINOPHEN 975 MG: 325 TABLET ORAL at 12:44

## 2023-12-09 RX ADMIN — POLYETHYLENE GLYCOL 3350 17 G: 17 POWDER, FOR SOLUTION ORAL at 09:03

## 2023-12-09 RX ADMIN — OXYCODONE HYDROCHLORIDE 5 MG: 5 TABLET ORAL at 04:22

## 2023-12-09 RX ADMIN — MAGNESIUM HYDROXIDE 30 ML: 400 SUSPENSION ORAL at 04:22

## 2023-12-09 RX ADMIN — HYDROXYZINE HYDROCHLORIDE 25 MG: 25 TABLET ORAL at 12:50

## 2023-12-09 RX ADMIN — ESCITALOPRAM OXALATE 10 MG: 10 TABLET ORAL at 09:03

## 2023-12-09 RX ADMIN — BISACODYL 10 MG: 10 SUPPOSITORY RECTAL at 09:04

## 2023-12-09 ASSESSMENT — ACTIVITIES OF DAILY LIVING (ADL)
ADLS_ACUITY_SCORE: 22

## 2023-12-09 NOTE — PROGRESS NOTES
Brief staff note    Chart review only.  Vitals unremarkable.  Hgb stable.  Urology consulted and sign off.

## 2023-12-09 NOTE — PLAN OF CARE
Goal Outcome Evaluation:       Nurse reviewed instructions with patient and his parents. Including the Stoplight Tool, wound care, and back restrictions. Particularly discussed keeping ice over area of ecchymosis, in the pelvic region, and keeping scrotum elevated on towel until swelling is gone.He was wearing his lumbar brace as he left, and stated that he had all of his belongings, including his cell phone and .

## 2023-12-09 NOTE — DISCHARGE SUMMARY
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 Eng on 23. There were no intraoperative complications and the patient was transferred to the recovery room and later the orthopedic unit in stable condition. Once the patient reached the orthopedic floor our orthopedic pain protocol was implemented along with the following:    Anticoagulation Medications: None  Therapy: PT and OT  Activity: WBAT  Bracing: None    Consultations during Admission: Hospitalist service for medical management     COMPLICATIONS/SIGNIFICANT FINDINGS   No complications.    Urology was consulted for scrotal swelling and bruising. The exam was found to be benign and not abnormal given recent surgery and abdominal approach.    DISCHARGE INFORMATION   Condition at discharge: Good  Discharge destination: Home  Patient was seen by  Dr. Baird  on the date of discharge.    FOLLOW UP CARE   Follow up with orthopedics in 6 weeks or sooner should the need arise. Ortho will continue to manage pain control, post op anticoagulation and incision care.     Follow up with your PCP for management of chronic medical problems and to evaluate for post op medical complications including constipation, nausea/vomiting, DVT/PE, anemia, changes in blood pressure, fevers/chills, urinary retention and atelectasis/pneumonia.     Subjective   Patient is doing well on POD #2. Pain is well  "controlled with oral medications. Ambulating. Tolerating oral intake.     Physical Exam   /78 (BP Location: Left arm)   Pulse 72   Temp 98.2  F (36.8  C) (Oral)   Resp 16   Ht 1.727 m (5' 8\")   Wt 75.1 kg (165 lb 9.6 oz)   SpO2 98%   BMI 25.18 kg/m    The patient is A&Ox3. Appears comfortable.   Sensation is intact in distal extremities.  Dorsiflexion and plantar flexion of the ankle is intact.  Dorsalis pedis pulse intact.  The incision is covered. Dressing C/D/I.     Relatively diffuse swelling of the upper groin and scrotum. Abdomen with some mild bruising. Soft, non tender to palpation     Pertinent Results at Discharge     Hemoglobin   Date/Time Value Ref Range Status   12/09/2023 09:10 AM 11.4 (L) 13.3 - 17.7 g/dL Final   12/08/2023 10:16 AM 11.4 (L) 13.3 - 17.7 g/dL Final   07/30/2008 05:24 AM 14.0 13.3 - 17.7 g/dL Final   07/29/2008 02:44 AM 13.0 (L) 13.3 - 17.7 g/dL Final   07/28/2008 09:54 PM 13.9 13.3 - 17.7 g/dL Final     INR   Date/Time Value Ref Range Status   07/29/2008 02:44 AM 1.04 0.86 - 1.14 Final   07/28/2008 12:59 PM 0.91 0.86 - 1.14 Final     Platelet Count   Date/Time Value Ref Range Status   07/30/2008 05:24  150 - 450 10e9/L Final   07/29/2008 02:44  150 - 450 10e9/L Final   07/28/2008 09:54  150 - 450 10e9/L Final       Problem List   Principal Problem:    Lumbar back pain      SANDI ADAMS PA-C/Dr. Eng  [facility]  [phone]  Date: 12/9/2023  Time: 1:19 PM    "

## 2023-12-09 NOTE — PLAN OF CARE
Goal Outcome Evaluation:  Problem: Adult Inpatient Plan of Care  Goal: Plan of Care Review  Description: The Plan of Care Review/Shift note should be completed every shift.  The Outcome Evaluation is a brief statement about your assessment that the patient is improving, declining, or no change.  This information will be displayed automatically on your shift  note.  Outcome: Progressing  Pt is alert and oriented x4. Pt's vital signs are stable, see flowsheet for details. Pt is voiding without difficulty and adequately. Pt denies flank and bladder pain. Pt passed PVR's. Pt's anton area still busied and mildly edematous.Elevated with hand towels. Pt's pain is controlled with oral pain medications. Ice applied. Pt's dressing is CDI. IS encouraged. CMS intact. Up with standby assistance. Pt is tolerating diet. Calls appropriately and can make needs known. Pt's alarms on. Nurse will continue to monitor.     Pt's last BM was on 12/06. Bowel sounds active in all quadrants. Pt denies constipation related abdominal pain. Pt is passing flatus. Nurse educated pt on constipation. Pt stated understanding. Nurse offered PRN laxatives. Pt agreeable to milk of magnesia in am.

## 2023-12-09 NOTE — PLAN OF CARE
Problem: Adult Inpatient Plan of Care  Goal: Absence of Hospital-Acquired Illness or Injury  Intervention: Identify and Manage Fall Risk  Recent Flowsheet Documentation  Taken 12/8/2023 1940 by Anitha Harrison RN  Safety Promotion/Fall Prevention:   clutter free environment maintained   increased rounding and observation   increase visualization of patient   lighting adjusted   mobility aid in reach   patient and family education   nonskid shoes/slippers when out of bed   patient video monitoring   room organization consistent   room door open   safety round/check completed      Patient vital signs are at baseline: Yes  Patient able to ambulate as they were prior to admission or with assist devices provided by therapies during their stay:  Yes  Patient MUST void prior to discharge:  Yes  Patient able to tolerate oral intake:  Yes  Pain has adequate pain control using Oral analgesics:  Yes  Does patient have an identified :  Yes  Has goal D/C date and time been discussed with patient:  Yes    Pt ambulated hallway this evening independently with brace on. Declines pain medications this evening, scheduled tylenol given. IV SL. Awaiting urology consult in AM.

## 2023-12-09 NOTE — CONSULTS
"  [facility] CONSULTATION    Type of Consult: inpatient  Place of Service: [facility]   Reason for consult: scrotal swelling and bruising   Request for consult by: dr. Eng    History of present illness:   Rolo Arevalo is a 58 year old male that was admitted for Lumbar back pain. Urology is being consulted for scrotal swelling and bruising . History obtained through patient, and chart review.     Patient reports scrotal swelling and bruising noted since surgery. Denies pain or any significant tenderness to his scrotum or penis. Does have incisional pain to abdomin. Reports voiding well on his own. BM this morning. Denies any dysuria, hematuria, or urinary concerns.     Past Medical History:  Past Medical History:   Diagnosis Date    Hypertension        Past Surgical History:  Past Surgical History:   Procedure Laterality Date    APPENDECTOMY      FUSION LUMBAR ANTERIOR ONE LEVEL N/A 12/7/2023    Procedure: LUMBAR 5-SACRAL 1 ANTERIOR LUMBAR INTERBODY FUSION;  Surgeon: Jd Eng MD;  Location: [facility] Main OR    SURGICAL EXPOSURE, ANTERIOR APPROACH, W/WOUND CLOSURE, FOR LUMBAR SPINE SURGERY, BY GENERAL SURGERY N/A 12/7/2023    Procedure: SURGICAL EXPOSURE, ANTERIOR APPROACH, WITH WOUND CLOSURE, FOR LUMBAR SPINE SURGERY, BY GENERAL SURGERY;  Surgeon: Jd Eng MD;  Location: [facility] Main OR       Social History:  Social History     Socioeconomic History    Marital status:      Spouse name: Not on file    Number of children: Not on file    Years of education: Not on file    Highest education level: Not on file   Occupational History    Not on file   Tobacco Use    Smoking status: Never    Smokeless tobacco: Never   Substance and Sexual Activity    Alcohol use: Yes     Comment: \"socially\"    Drug use: Never    Sexual activity: Not on file   Other Topics Concern    Not on file   Social History Narrative    Not on file     Social Determinants of Health "     Financial Resource Strain: Not on file   Food Insecurity: Not on file   Transportation Needs: Not on file   Physical Activity: Not on file   Stress: Not on file   Social Connections: Not on file   Interpersonal Safety: Not on file   Housing Stability: Not on file       Medications:  Current Facility-Administered Medications   Medication    [START ON 12/10/2023] acetaminophen (TYLENOL) tablet 650 mg    acetaminophen (TYLENOL) tablet 975 mg    benzocaine-menthol (CEPACOL) 15-3.6 MG lozenge 1 lozenge    bisacodyl (DULCOLAX) suppository 10 mg    escitalopram (LEXAPRO) tablet 10 mg    hydrochlorothiazide (MICROZIDE) capsule 12.5 mg    HYDROmorphone (DILAUDID) injection 0.2 mg    Or    HYDROmorphone (DILAUDID) injection 0.4 mg    hydrOXYzine HCl (ATARAX) tablet 25 mg    lisinopril (ZESTRIL) tablet 40 mg    loratadine (CLARITIN) tablet 10 mg    magnesium hydroxide (MILK OF MAGNESIA) suspension 30 mL    multivitamin, therapeutic (THERA-VIT) tablet 1 tablet    naloxone (NARCAN) injection 0.2 mg    Or    naloxone (NARCAN) injection 0.4 mg    Or    naloxone (NARCAN) injection 0.2 mg    Or    naloxone (NARCAN) injection 0.4 mg    ondansetron (ZOFRAN ODT) ODT tab 4 mg    Or    ondansetron (ZOFRAN) injection 4 mg    oxyCODONE (ROXICODONE) tablet 5 mg    Or    oxyCODONE (ROXICODONE) tablet 10 mg    polyethylene glycol (MIRALAX) Packet 17 g    pregabalin (LYRICA) capsule 75 mg    prochlorperazine (COMPAZINE) injection 10 mg    Or    prochlorperazine (COMPAZINE) tablet 10 mg    senna-docusate (SENOKOT-S/PERICOLACE) 8.6-50 MG per tablet 1 tablet    sodium chloride 0.9 % infusion    tiZANidine (ZANAFLEX) tablet 4 mg       Allergies:   No Known Allergies    Review of Systems:   A full 12 point review of systems was taken and is negative aside from what is noted above in the HPI    Physical Exam:  Temp:  [97.5  F (36.4  C)-98.2  F (36.8  C)] 98.2  F (36.8  C)  Pulse:  [68-75] 72  Resp:  [15-18] 16  BP: (105-128)/(54-78)  128/78  SpO2:  [96 %-98 %] 98 %  General: NAD, alert, cooperative  Head: normocephalic, without abnormality / atraumatic  Abdomen: soft, appropriately tender, non distended. no suprapubic fullness. no CVA tenderness noted, Incisions C/D/I covered.   Geniturinary: normal genitalia, mild edema, ecchymosis to scrotum and penis, non tender to palpation, no fluctuance, no crepitus  Skin: no rashes or lesions  Musculoskeletal: moves all extremities equally; no calf edema or tenderness  Psychological: alert and oriented, answers questions appropriately      Labs:   Creatinine   Date Value Ref Range Status   07/30/2008 0.72 0.66 - 1.25 mg/dL Final     Comment:     New IDMS-traceable calibration  beginning 5/1/08       Lab Results   Component Value Date    WBC 4.6 07/30/2008     Lab Results   Component Value Date    HGB 11.4 12/09/2023    HGB 14.0 07/30/2008     Lab Results   Component Value Date     07/30/2008     Lab Results: personally reviewed       Assessment / Plan : Rolo Arevalo is being seen by [facility] for scrotal swelling and bruising     - Exam benign and not abnormal given recent surgery and abdominal approach.   - Supportive cares if needed: tylenol/ice/heat/elevation.  - urology will sign off. Please do nothesitate to call or re consult with urological concerns.     This case was discussed with:  Dr Tom Mancilla     Thank you for consulting [facility] regarding this patient's care. Please contact us with questions or concerns.     Claudia Barber PA-C  [facility]   [phone]

## 2023-12-09 NOTE — PROGRESS NOTES
"Orthopedic Progress Note      Assessment: 2 Days Post-Op  S/P Procedure(s):  LUMBAR 5-SACRAL 1 ANTERIOR LUMBAR INTERBODY FUSION  SURGICAL EXPOSURE, ANTERIOR APPROACH, WITH WOUND CLOSURE, FOR LUMBAR SPINE SURGERY, BY GENERAL SURGERY     Plan:   - Continue PT/OT  - Weightbearing status: WBAT   - No bending, twisting or lifting more than 10 pounds for 6 weeks.  - Anticoagulation: no chemical prophylaxis in addition to SCDs, ayaka stockings and early ambulation.  - Pain control at discharge: Oxycodone 5 mg 1-2 tabs Q4-6 hours x30-32 tabs, Hydroxyzine 25 mg QID PRN, Gabapentin 100 mg TID, Tizanidine 4 mg TID, Acetaminophen 1000 mg TID  - Discharge planning: Pending urology consult      Subjective:  Rolo continues to do well. Having scrotal swelling and bruising, minimal abdominal pain. Voiding.     Objective:  /78 (BP Location: Left arm)   Pulse 72   Temp 98.2  F (36.8  C) (Oral)   Resp 16   Ht 1.727 m (5' 8\")   Wt 75.1 kg (165 lb 9.6 oz)   SpO2 98%   BMI 25.18 kg/m    The patient is A&Ox3. Appears comfortable.   Sensation is intact in distal extremities.  Dorsiflexion and plantar flexion of the ankle is intact.  Dorsalis pedis pulse intact.  The incision is covered. Dressing C/D/I.    Relatively diffuse swelling of the upper groin and scrotum. Abdomen with some mild bruising. Soft, non tender to palpation     Pertinent Labs   Lab Results:  Lab Results   Component Value Date    INR 1.04 07/29/2008    INR 0.91 07/28/2008     Lab Results   Component Value Date    WBC 4.6 07/30/2008    HGB 11.4 (L) 12/09/2023    HCT 43.1 07/30/2008    MCV 92 07/30/2008     07/30/2008     Lab Results   Component Value Date     07/30/2008    CO2 29 07/30/2008         Report completed by:  Everett Baird MD  [facility]    Date: 12/9/2023  Time: 9:28 AM    "

## (undated) DEVICE — SU VICRYL+ 3-0 27IN SH UND VCP416H

## (undated) DEVICE — GOWN IMPERVIOUS BREATHABLE SMART LG 89015

## (undated) DEVICE — HLSTR JET MULTI-INST SFTY STRL FIRE-SFE 7212-12

## (undated) DEVICE — GLOVE BIOGEL PI SZ 8.0 40880

## (undated) DEVICE — ESU ELEC BLADE 6" COATED E1450-6

## (undated) DEVICE — SPONGE KITNER DISSECTING 7102*

## (undated) DEVICE — SPONGE LAP 18X18" X8435

## (undated) DEVICE — ESU LIGASURE MARYLAND JAW OPEN VESSEL 23CM LF1923

## (undated) DEVICE — SUCTION TIP POOLE STERILE 35040

## (undated) DEVICE — ELECTRODE PATIENT RETURN ADULT L10 FT 2 PLATE CORD 0855C

## (undated) DEVICE — PREP CHLORAPREP 26ML TINTED HI-LITE ORANGE 930815

## (undated) DEVICE — SUTURE SILK 2-0 TIES 30IN SA85H

## (undated) DEVICE — PACK MINOR SINGLE BASIN SSK3001

## (undated) DEVICE — CATH TRAY FOLEY SURESTEP 16FR DRAIN BAG STATOCK A899916

## (undated) DEVICE — DRAPE BACK TABLE PADDED 60X90

## (undated) DEVICE — RX SURGIFLO HEMOSTATIC MATRIX 8ML 2991

## (undated) DEVICE — SUTURE MONOCRYL 3-0 18 PS2 UND MCP497G

## (undated) DEVICE — GLOVE BIOGEL PI ULTRATOUCH G SZ 7.5 42175

## (undated) DEVICE — CUSTOM PACK LUMBAR FUSION SNE5BLFHEA

## (undated) DEVICE — SUTURE PDS 0 60IN CTX+ LOOPED PDP990G

## (undated) DEVICE — GLOVE BIOGEL PI INDICATOR 8.0 LF 41680

## (undated) DEVICE — SOL WATER IRRIG 1000ML BOTTLE 2F7114

## (undated) DEVICE — DRAPE TOWEL IMPERVIOUS X2 7553

## (undated) DEVICE — DRAPE STERI TOWEL LG 1010

## (undated) DEVICE — ESU PENCIL SMOKE EVAC W/ROCKER SWITCH 0703-047-000

## (undated) DEVICE — CELL SAVER

## (undated) DEVICE — SU DERMABOND ADVANCED .7ML DNX12

## (undated) DEVICE — ANTIFOG SOLUTION W/FOAM PAD CF-1002

## (undated) DEVICE — GOWN XLG DISP 9545

## (undated) DEVICE — DRSG PRIMAPORE 04X11 3/4"

## (undated) DEVICE — SOL NACL 0.9% IRRIG 1000ML BOTTLE 2F7124

## (undated) DEVICE — SUTURE VICRYL+ 2-0 27IN CT-1 UND VCP259H

## (undated) DEVICE — GLOVE BIOGEL PI SZ 7.0 40870

## (undated) DEVICE — DRAPE C-ARMOR 5 SIDED 5523

## (undated) DEVICE — DRAPE C-ARM 60X42" 1013

## (undated) DEVICE — GLOVE UNDER INDICATOR PI SZ 7.0 LF 41670

## (undated) RX ORDER — LIDOCAINE HYDROCHLORIDE 10 MG/ML
INJECTION, SOLUTION EPIDURAL; INFILTRATION; INTRACAUDAL; PERINEURAL
Status: DISPENSED
Start: 2023-12-07

## (undated) RX ORDER — PROPOFOL 10 MG/ML
INJECTION, EMULSION INTRAVENOUS
Status: DISPENSED
Start: 2023-12-07

## (undated) RX ORDER — ONDANSETRON 2 MG/ML
INJECTION INTRAMUSCULAR; INTRAVENOUS
Status: DISPENSED
Start: 2023-12-07

## (undated) RX ORDER — DEXMEDETOMIDINE HYDROCHLORIDE 4 UG/ML
INJECTION, SOLUTION INTRAVENOUS
Status: DISPENSED
Start: 2023-12-07